# Patient Record
Sex: FEMALE | Race: OTHER | Employment: UNEMPLOYED | URBAN - METROPOLITAN AREA
[De-identification: names, ages, dates, MRNs, and addresses within clinical notes are randomized per-mention and may not be internally consistent; named-entity substitution may affect disease eponyms.]

---

## 2022-04-13 PROBLEM — F44.4 FUNCTIONAL NEUROLOGICAL SYMPTOM DISORDER WITH ABNORMAL MOVEMENT: Status: ACTIVE | Noted: 2018-12-18

## 2022-04-13 PROBLEM — G43.109 MIGRAINE WITH AURA AND WITHOUT STATUS MIGRAINOSUS, NOT INTRACTABLE: Status: ACTIVE | Noted: 2022-04-13

## 2022-04-13 PROBLEM — G04.81 AUTOIMMUNE ENCEPHALOMYELITIS: Status: ACTIVE | Noted: 2017-07-26

## 2022-04-13 PROBLEM — F32.5 MAJOR DEPRESSIVE DISORDER IN FULL REMISSION (HCC): Status: ACTIVE | Noted: 2022-04-13

## 2022-04-25 PROBLEM — R55 VASOVAGAL SYNCOPE: Status: ACTIVE | Noted: 2022-04-25

## 2022-05-03 PROBLEM — R00.0 TACHYCARDIA: Status: ACTIVE | Noted: 2022-05-03

## 2023-05-11 ENCOUNTER — OFFICE VISIT (OUTPATIENT)
Dept: PRIMARY CARE CLINIC | Age: 23
End: 2023-05-11
Payer: COMMERCIAL

## 2023-05-11 VITALS
DIASTOLIC BLOOD PRESSURE: 54 MMHG | HEART RATE: 90 BPM | WEIGHT: 95 LBS | SYSTOLIC BLOOD PRESSURE: 94 MMHG | BODY MASS INDEX: 16.22 KG/M2 | RESPIRATION RATE: 98 BRPM | HEIGHT: 64 IN

## 2023-05-11 DIAGNOSIS — G90.1 DYSAUTONOMIA (HCC): ICD-10-CM

## 2023-05-11 DIAGNOSIS — F32.5 MAJOR DEPRESSIVE DISORDER IN FULL REMISSION, UNSPECIFIED WHETHER RECURRENT (HCC): ICD-10-CM

## 2023-05-11 DIAGNOSIS — G43.109 MIGRAINE WITH AURA AND WITHOUT STATUS MIGRAINOSUS, NOT INTRACTABLE: ICD-10-CM

## 2023-05-11 DIAGNOSIS — F44.4 FUNCTIONAL NEUROLOGICAL SYMPTOM DISORDER WITH ABNORMAL MOVEMENT: ICD-10-CM

## 2023-05-11 DIAGNOSIS — G04.81 AUTOIMMUNE ENCEPHALOMYELITIS: ICD-10-CM

## 2023-05-11 DIAGNOSIS — R11.2 NAUSEA AND VOMITING, UNSPECIFIED VOMITING TYPE: ICD-10-CM

## 2023-05-11 DIAGNOSIS — S39.012D STRAIN OF LUMBAR REGION, SUBSEQUENT ENCOUNTER: ICD-10-CM

## 2023-05-11 DIAGNOSIS — G04.81 AUTOIMMUNE ENCEPHALOMYELITIS: Primary | ICD-10-CM

## 2023-05-11 DIAGNOSIS — M25.551 RIGHT HIP PAIN: ICD-10-CM

## 2023-05-11 DIAGNOSIS — M62.838 MUSCLE SPASM: ICD-10-CM

## 2023-05-11 PROBLEM — S39.012A STRAIN OF LUMBAR REGION: Status: ACTIVE | Noted: 2023-05-11

## 2023-05-11 PROCEDURE — 1036F TOBACCO NON-USER: CPT | Performed by: INTERNAL MEDICINE

## 2023-05-11 PROCEDURE — G8419 CALC BMI OUT NRM PARAM NOF/U: HCPCS | Performed by: INTERNAL MEDICINE

## 2023-05-11 PROCEDURE — G8427 DOCREV CUR MEDS BY ELIG CLIN: HCPCS | Performed by: INTERNAL MEDICINE

## 2023-05-11 PROCEDURE — 99214 OFFICE O/P EST MOD 30 MIN: CPT | Performed by: INTERNAL MEDICINE

## 2023-05-11 RX ORDER — ONDANSETRON 4 MG/1
4 TABLET, FILM COATED ORAL 3 TIMES DAILY PRN
Qty: 60 TABLET | Refills: 5 | Status: SHIPPED | OUTPATIENT
Start: 2023-05-11

## 2023-05-11 RX ORDER — TOPIRAMATE 50 MG/1
50 TABLET, FILM COATED ORAL 2 TIMES DAILY
Qty: 180 TABLET | Refills: 1 | Status: SHIPPED | OUTPATIENT
Start: 2023-05-11 | End: 2023-08-09

## 2023-05-11 RX ORDER — BACLOFEN 10 MG/1
10 TABLET ORAL 3 TIMES DAILY
Qty: 90 TABLET | Refills: 5 | Status: SHIPPED | OUTPATIENT
Start: 2023-05-11

## 2023-05-11 RX ORDER — ESCITALOPRAM OXALATE 10 MG/1
10 TABLET ORAL DAILY
Qty: 90 TABLET | Refills: 1 | Status: SHIPPED | OUTPATIENT
Start: 2023-05-11

## 2023-05-11 RX ORDER — BUPROPION HYDROCHLORIDE 300 MG/1
300 TABLET ORAL
Qty: 90 TABLET | Refills: 1 | Status: SHIPPED | OUTPATIENT
Start: 2023-05-11

## 2023-05-11 SDOH — ECONOMIC STABILITY: INCOME INSECURITY: HOW HARD IS IT FOR YOU TO PAY FOR THE VERY BASICS LIKE FOOD, HOUSING, MEDICAL CARE, AND HEATING?: NOT HARD AT ALL

## 2023-05-11 SDOH — ECONOMIC STABILITY: HOUSING INSECURITY
IN THE LAST 12 MONTHS, WAS THERE A TIME WHEN YOU DID NOT HAVE A STEADY PLACE TO SLEEP OR SLEPT IN A SHELTER (INCLUDING NOW)?: NO

## 2023-05-11 SDOH — ECONOMIC STABILITY: FOOD INSECURITY: WITHIN THE PAST 12 MONTHS, YOU WORRIED THAT YOUR FOOD WOULD RUN OUT BEFORE YOU GOT MONEY TO BUY MORE.: NEVER TRUE

## 2023-05-11 SDOH — ECONOMIC STABILITY: FOOD INSECURITY: WITHIN THE PAST 12 MONTHS, THE FOOD YOU BOUGHT JUST DIDN'T LAST AND YOU DIDN'T HAVE MONEY TO GET MORE.: NEVER TRUE

## 2023-05-11 ASSESSMENT — ENCOUNTER SYMPTOMS
CONSTIPATION: 0
COLOR CHANGE: 0
TROUBLE SWALLOWING: 0
ABDOMINAL PAIN: 0
ABDOMINAL DISTENTION: 0
BACK PAIN: 0
COUGH: 0
NAUSEA: 0
SORE THROAT: 0
ROS SKIN COMMENTS: ACNE
VOMITING: 0
SINUS PRESSURE: 0
SHORTNESS OF BREATH: 0
CHEST TIGHTNESS: 0
DIARRHEA: 0
EYE REDNESS: 0
WHEEZING: 0
EYE PAIN: 0
BLOOD IN STOOL: 0

## 2023-05-11 ASSESSMENT — PATIENT HEALTH QUESTIONNAIRE - PHQ9
2. FEELING DOWN, DEPRESSED OR HOPELESS: 0
SUM OF ALL RESPONSES TO PHQ QUESTIONS 1-9: 0
4. FEELING TIRED OR HAVING LITTLE ENERGY: 0
7. TROUBLE CONCENTRATING ON THINGS, SUCH AS READING THE NEWSPAPER OR WATCHING TELEVISION: 0
10. IF YOU CHECKED OFF ANY PROBLEMS, HOW DIFFICULT HAVE THESE PROBLEMS MADE IT FOR YOU TO DO YOUR WORK, TAKE CARE OF THINGS AT HOME, OR GET ALONG WITH OTHER PEOPLE: 0
6. FEELING BAD ABOUT YOURSELF - OR THAT YOU ARE A FAILURE OR HAVE LET YOURSELF OR YOUR FAMILY DOWN: 0
SUM OF ALL RESPONSES TO PHQ QUESTIONS 1-9: 0
8. MOVING OR SPEAKING SO SLOWLY THAT OTHER PEOPLE COULD HAVE NOTICED. OR THE OPPOSITE, BEING SO FIGETY OR RESTLESS THAT YOU HAVE BEEN MOVING AROUND A LOT MORE THAN USUAL: 0
3. TROUBLE FALLING OR STAYING ASLEEP: 0
9. THOUGHTS THAT YOU WOULD BE BETTER OFF DEAD, OR OF HURTING YOURSELF: 0
SUM OF ALL RESPONSES TO PHQ QUESTIONS 1-9: 0
5. POOR APPETITE OR OVEREATING: 0
SUM OF ALL RESPONSES TO PHQ QUESTIONS 1-9: 0

## 2023-05-11 NOTE — PROGRESS NOTES
failed to calculate for the following reasons: The 2019 ASCVD risk score is only valid for ages 36 to 78     Review of Systems   Constitutional:  Negative for activity change, appetite change, chills, fatigue, fever and unexpected weight change. HENT:  Negative for congestion, ear pain, postnasal drip, sinus pressure, sore throat, tinnitus and trouble swallowing. Eyes:  Negative for pain and redness. Respiratory:  Negative for cough, chest tightness, shortness of breath and wheezing. Cardiovascular:  Negative for chest pain, palpitations and leg swelling. Gastrointestinal:  Negative for abdominal distention, abdominal pain, blood in stool, constipation, diarrhea, nausea and vomiting. Endocrine: Negative for cold intolerance, heat intolerance and polydipsia. Genitourinary:  Negative for decreased urine volume, dysuria, flank pain, frequency, hematuria and urgency. Musculoskeletal:  Negative for arthralgias, back pain, joint swelling, neck pain and neck stiffness. Skin:  Negative for color change and rash. Acne   Neurological:  Positive for weakness. Negative for dizziness, numbness and headaches. Hematological:  Negative for adenopathy. Psychiatric/Behavioral:  Negative for behavioral problems, sleep disturbance and suicidal ideas. The patient is not nervous/anxious. History of depression      BP (!) 94/54 (Site: Left Upper Arm, Position: Sitting, Cuff Size: Medium Adult)   Pulse 90   Resp (!) 98   Ht 5' 4\" (1.626 m)   Wt 95 lb (43.1 kg)   BMI 16.31 kg/m²    Physical Exam  Constitutional:       General: She is not in acute distress. Appearance: Normal appearance. She is not ill-appearing. HENT:      Head: Normocephalic and atraumatic. Right Ear: External ear normal.      Left Ear: External ear normal.      Mouth/Throat:      Mouth: Mucous membranes are moist.      Pharynx: No oropharyngeal exudate or posterior oropharyngeal erythema.    Eyes:      Extraocular

## 2023-05-11 NOTE — ASSESSMENT & PLAN NOTE
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

## 2023-05-18 DIAGNOSIS — Q87.89 VOLTAGE-GATED POTASSIUM CHANNEL ANTIBODY SYNDROME: ICD-10-CM

## 2023-05-18 DIAGNOSIS — F44.4 FUNCTIONAL NEUROLOGICAL SYMPTOM DISORDER WITH ABNORMAL MOVEMENT: ICD-10-CM

## 2023-05-18 DIAGNOSIS — M25.372 LEFT ANKLE INSTABILITY: ICD-10-CM

## 2023-05-18 DIAGNOSIS — R26.9 ABNORMAL GAIT DUE TO MUSCLE WEAKNESS: ICD-10-CM

## 2023-05-18 DIAGNOSIS — M62.81 ABNORMAL GAIT DUE TO MUSCLE WEAKNESS: ICD-10-CM

## 2023-05-18 DIAGNOSIS — Z15.89 MONOALLELIC MUTATION OF SPTAN1 GENE: ICD-10-CM

## 2023-05-18 DIAGNOSIS — G04.81 AUTOIMMUNE ENCEPHALOMYELITIS: ICD-10-CM

## 2023-05-18 DIAGNOSIS — S39.012D STRAIN OF LUMBAR REGION, SUBSEQUENT ENCOUNTER: Primary | ICD-10-CM

## 2023-05-18 DIAGNOSIS — M25.551 RIGHT HIP PAIN: ICD-10-CM

## 2023-05-18 DIAGNOSIS — G04.81 AUTOIMMUNE ENCEPHALOMYELITIS: Primary | ICD-10-CM

## 2023-06-19 DIAGNOSIS — M21.612 BUNION, LEFT FOOT: Primary | ICD-10-CM

## 2023-06-21 DIAGNOSIS — J30.0 VASOMOTOR RHINITIS: ICD-10-CM

## 2023-06-22 RX ORDER — FLUTICASONE PROPIONATE 50 MCG
1 SPRAY, SUSPENSION (ML) NASAL DAILY
Qty: 3 EACH | Refills: 1 | Status: SHIPPED | OUTPATIENT
Start: 2023-06-22

## 2023-07-05 DIAGNOSIS — H53.8 BALINT'S SYNDROME: ICD-10-CM

## 2023-07-05 DIAGNOSIS — Z15.89 MONOALLELIC MUTATION OF SPTAN1 GENE: ICD-10-CM

## 2023-07-05 DIAGNOSIS — E16.2 HYPOGLYCEMIA: Primary | ICD-10-CM

## 2023-07-05 DIAGNOSIS — Q87.89 VOLTAGE-GATED POTASSIUM CHANNEL ANTIBODY SYNDROME: ICD-10-CM

## 2023-07-05 DIAGNOSIS — G04.81 AUTOIMMUNE ENCEPHALOMYELITIS: ICD-10-CM

## 2023-07-12 ENCOUNTER — TELEPHONE (OUTPATIENT)
Dept: PRIMARY CARE CLINIC | Age: 23
End: 2023-07-12

## 2023-07-12 NOTE — TELEPHONE ENCOUNTER
Please call patient @ [phone]. She would like an explanation of the [employer] message that was left for her. She said no authorization is needed and Dr. Adolph Padron just needs to sign the paperwork.

## 2023-07-12 NOTE — TELEPHONE ENCOUNTER
She could schedule a VV. This is  time consuming given all the paperwork that needs to be filled out. I need dedicated appointment to get it done.

## 2023-07-12 NOTE — TELEPHONE ENCOUNTER
Please let patient know that this is a time-consuming process. I need a dedicated office visit of 15 minutes to be able to fill out ALL the paperwork, because I do not have enough time in between seeing patients to do it.

## 2023-07-12 NOTE — TELEPHONE ENCOUNTER
Patient called in concerning the paperwork for her wheelchair. She was informed that she needs to schedule an appointment with Dr. Geoffrey Pickens to fill out the paperwork. I tried to schedule an appointment with the patient. Patient says this is an urgent matter and she can not meet with Dr. Geoffrey Pickens for at least a month because she would need to take time off from work. She would like a phone call to go over the paperwork.

## 2023-07-15 DIAGNOSIS — F32.5 MAJOR DEPRESSIVE DISORDER IN FULL REMISSION, UNSPECIFIED WHETHER RECURRENT (HCC): ICD-10-CM

## 2023-07-15 DIAGNOSIS — L70.0 ACNE VULGARIS: ICD-10-CM

## 2023-07-17 RX ORDER — BUPROPION HYDROCHLORIDE 300 MG/1
300 TABLET ORAL
Qty: 90 TABLET | Refills: 1 | Status: SHIPPED | OUTPATIENT
Start: 2023-07-17

## 2023-07-17 RX ORDER — ESCITALOPRAM OXALATE 10 MG/1
10 TABLET ORAL DAILY
Qty: 90 TABLET | Refills: 1 | Status: SHIPPED | OUTPATIENT
Start: 2023-07-17

## 2023-07-17 RX ORDER — BENZOYL PEROXIDE 10 G/100G
GEL TOPICAL
Qty: 28 G | Refills: 5 | Status: SHIPPED | OUTPATIENT
Start: 2023-07-17

## 2023-09-09 DIAGNOSIS — F32.5 MAJOR DEPRESSIVE DISORDER IN FULL REMISSION, UNSPECIFIED WHETHER RECURRENT (HCC): ICD-10-CM

## 2023-09-09 DIAGNOSIS — G04.81 AUTOIMMUNE ENCEPHALOMYELITIS: ICD-10-CM

## 2023-09-09 DIAGNOSIS — M62.838 MUSCLE SPASM: ICD-10-CM

## 2023-09-11 RX ORDER — BUPROPION HYDROCHLORIDE 300 MG/1
300 TABLET ORAL
Qty: 90 TABLET | Refills: 1 | Status: SHIPPED | OUTPATIENT
Start: 2023-09-11

## 2023-09-11 RX ORDER — BACLOFEN 10 MG/1
10 TABLET ORAL 3 TIMES DAILY
Qty: 90 TABLET | Refills: 5 | Status: SHIPPED | OUTPATIENT
Start: 2023-09-11

## 2023-09-11 RX ORDER — ESCITALOPRAM OXALATE 10 MG/1
10 TABLET ORAL DAILY
Qty: 90 TABLET | Refills: 1 | Status: SHIPPED | OUTPATIENT
Start: 2023-09-11

## 2023-09-12 DIAGNOSIS — G43.109 MIGRAINE WITH AURA AND WITHOUT STATUS MIGRAINOSUS, NOT INTRACTABLE: ICD-10-CM

## 2023-09-13 RX ORDER — TOPIRAMATE 50 MG/1
50 TABLET, FILM COATED ORAL 2 TIMES DAILY
Qty: 180 TABLET | Refills: 1 | Status: SHIPPED | OUTPATIENT
Start: 2023-09-13 | End: 2023-12-12

## 2023-10-13 SDOH — HEALTH STABILITY: PHYSICAL HEALTH: ON AVERAGE, HOW MANY MINUTES DO YOU ENGAGE IN EXERCISE AT THIS LEVEL?: 30 MIN

## 2023-10-13 SDOH — HEALTH STABILITY: PHYSICAL HEALTH: ON AVERAGE, HOW MANY DAYS PER WEEK DO YOU ENGAGE IN MODERATE TO STRENUOUS EXERCISE (LIKE A BRISK WALK)?: 2 DAYS

## 2023-10-13 ASSESSMENT — LIFESTYLE VARIABLES
HOW OFTEN DO YOU HAVE A DRINK CONTAINING ALCOHOL: NEVER
HOW MANY STANDARD DRINKS CONTAINING ALCOHOL DO YOU HAVE ON A TYPICAL DAY: PATIENT DOES NOT DRINK
HOW MANY STANDARD DRINKS CONTAINING ALCOHOL DO YOU HAVE ON A TYPICAL DAY: 0
HOW OFTEN DO YOU HAVE SIX OR MORE DRINKS ON ONE OCCASION: 1
HOW OFTEN DO YOU HAVE A DRINK CONTAINING ALCOHOL: 1

## 2023-10-13 ASSESSMENT — PATIENT HEALTH QUESTIONNAIRE - PHQ9
2. FEELING DOWN, DEPRESSED OR HOPELESS: 0
SUM OF ALL RESPONSES TO PHQ9 QUESTIONS 1 & 2: 0
SUM OF ALL RESPONSES TO PHQ QUESTIONS 1-9: 0
1. LITTLE INTEREST OR PLEASURE IN DOING THINGS: 0
SUM OF ALL RESPONSES TO PHQ QUESTIONS 1-9: 0

## 2023-10-14 DIAGNOSIS — G04.81 AUTOIMMUNE ENCEPHALOMYELITIS: ICD-10-CM

## 2023-10-14 DIAGNOSIS — M62.838 MUSCLE SPASM: ICD-10-CM

## 2023-10-16 ENCOUNTER — OFFICE VISIT (OUTPATIENT)
Dept: PRIMARY CARE CLINIC | Age: 23
End: 2023-10-16
Payer: COMMERCIAL

## 2023-10-16 VITALS
RESPIRATION RATE: 14 BRPM | HEIGHT: 64 IN | SYSTOLIC BLOOD PRESSURE: 104 MMHG | BODY MASS INDEX: 20.66 KG/M2 | WEIGHT: 121 LBS | DIASTOLIC BLOOD PRESSURE: 58 MMHG | OXYGEN SATURATION: 99 % | HEART RATE: 94 BPM

## 2023-10-16 DIAGNOSIS — Z00.00 MEDICARE ANNUAL WELLNESS VISIT, SUBSEQUENT: Primary | ICD-10-CM

## 2023-10-16 DIAGNOSIS — G43.109 MIGRAINE WITH AURA AND WITHOUT STATUS MIGRAINOSUS, NOT INTRACTABLE: ICD-10-CM

## 2023-10-16 DIAGNOSIS — Z12.4 PAP SMEAR FOR CERVICAL CANCER SCREENING: ICD-10-CM

## 2023-10-16 DIAGNOSIS — H53.8 BALINT'S SYNDROME: ICD-10-CM

## 2023-10-16 DIAGNOSIS — Z13.220 SCREENING CHOLESTEROL LEVEL: ICD-10-CM

## 2023-10-16 DIAGNOSIS — G04.81 AUTOIMMUNE ENCEPHALOMYELITIS: ICD-10-CM

## 2023-10-16 DIAGNOSIS — F32.5 MAJOR DEPRESSIVE DISORDER IN FULL REMISSION, UNSPECIFIED WHETHER RECURRENT (HCC): ICD-10-CM

## 2023-10-16 DIAGNOSIS — G90.1 DYSAUTONOMIA (HCC): ICD-10-CM

## 2023-10-16 DIAGNOSIS — Q87.89 VOLTAGE-GATED POTASSIUM CHANNEL ANTIBODY SYNDROME: ICD-10-CM

## 2023-10-16 DIAGNOSIS — E16.2 HYPOGLYCEMIA: ICD-10-CM

## 2023-10-16 DIAGNOSIS — Z15.89 MONOALLELIC MUTATION OF SPTAN1 GENE: ICD-10-CM

## 2023-10-16 DIAGNOSIS — R04.0 BLEEDING NOSE: ICD-10-CM

## 2023-10-16 PROBLEM — F44.4 FUNCTIONAL NEUROLOGICAL SYMPTOM DISORDER WITH ABNORMAL MOVEMENT: Status: RESOLVED | Noted: 2018-12-18 | Resolved: 2023-10-16

## 2023-10-16 PROCEDURE — G0439 PPPS, SUBSEQ VISIT: HCPCS | Performed by: INTERNAL MEDICINE

## 2023-10-16 PROCEDURE — G8484 FLU IMMUNIZE NO ADMIN: HCPCS | Performed by: INTERNAL MEDICINE

## 2023-10-16 RX ORDER — OXYMETAZOLINE HYDROCHLORIDE 0.05 G/100ML
2 SPRAY NASAL 2 TIMES DAILY PRN
Qty: 15 ML | Refills: 3 | Status: SHIPPED | OUTPATIENT
Start: 2023-10-16 | End: 2024-03-14

## 2023-10-16 ASSESSMENT — PATIENT HEALTH QUESTIONNAIRE - PHQ9
SUM OF ALL RESPONSES TO PHQ QUESTIONS 1-9: 0
5. POOR APPETITE OR OVEREATING: 0
7. TROUBLE CONCENTRATING ON THINGS, SUCH AS READING THE NEWSPAPER OR WATCHING TELEVISION: 0
2. FEELING DOWN, DEPRESSED OR HOPELESS: 0
SUM OF ALL RESPONSES TO PHQ9 QUESTIONS 1 & 2: 0
6. FEELING BAD ABOUT YOURSELF - OR THAT YOU ARE A FAILURE OR HAVE LET YOURSELF OR YOUR FAMILY DOWN: 0
3. TROUBLE FALLING OR STAYING ASLEEP: 0
8. MOVING OR SPEAKING SO SLOWLY THAT OTHER PEOPLE COULD HAVE NOTICED. OR THE OPPOSITE, BEING SO FIGETY OR RESTLESS THAT YOU HAVE BEEN MOVING AROUND A LOT MORE THAN USUAL: 0
9. THOUGHTS THAT YOU WOULD BE BETTER OFF DEAD, OR OF HURTING YOURSELF: 0
10. IF YOU CHECKED OFF ANY PROBLEMS, HOW DIFFICULT HAVE THESE PROBLEMS MADE IT FOR YOU TO DO YOUR WORK, TAKE CARE OF THINGS AT HOME, OR GET ALONG WITH OTHER PEOPLE: 0
1. LITTLE INTEREST OR PLEASURE IN DOING THINGS: 0
4. FEELING TIRED OR HAVING LITTLE ENERGY: 0

## 2023-10-16 NOTE — ASSESSMENT & PLAN NOTE
Continue as needed nutritional shakes. Continue monitoring blood sugar we will get some blood work. Referred to endocrinology.

## 2023-10-16 NOTE — ASSESSMENT & PLAN NOTE
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   Patient was admitted in 2002, his diagnosis has resulted in sequelae  Patient no longer able to see  Dr Syed Hickamn Neuroimmunologist at [facility] in [place] by telehealth  Patient will try to establish with  [facility] [employer] clinic when her insurance changes

## 2023-10-16 NOTE — ASSESSMENT & PLAN NOTE
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 Addie Anguiano Neuroimmunologist at the [facility], however no longer sees patient, she  cannot do virtual visits out of state.   Her insurance may change and we will try and get her to see neuro immunologist based out of the [facility] clinic

## 2023-10-16 NOTE — ASSESSMENT & PLAN NOTE
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

## 2023-10-16 NOTE — ASSESSMENT & PLAN NOTE
Patient comes in for  Medicare AWV   we discussed age appropriate USPSTF screens  Over 75% of the visit was spent counselling patient on appropriate lifestyle choices.

## 2023-10-16 NOTE — PATIENT INSTRUCTIONS
down on a couch or recliner chair. Small portable electric or kerosene heaters are responsible for many home fires and should be used cautiously if at all. If you do use one, be sure to keep them away from flammable materials. In case of fire, make sure you have a pre-established emergency exit plan. Have a professional check your fireplace and other fuel-burning appliances yearly. Helping Hands   Baby boomers entering the shen years will continue to see the development of new products to help older adults live safely and independently in spite of age-related changes. Making Life More Livable  , by Charlene Marquez, lists over 1,000 products for \"living well in the mature years,\" such as bathing and mobility aids, household security devices, ergonomically designed knives and peelers, and faucet valves and knobs for temperature control. Medical supply stores and organizations are good sources of information about products that improve your quality of life and insure your safety. Last Reviewed: November 2009 Kandice Mackey MD   Updated: 3/7/2011            Advance Care Planning: Care Instructions  Overview     It can be hard to live with an illness that cannot be cured. But if your health is getting worse, you may want to make decisions about end-of-life care. Planning for the end of your life does not mean that you are giving up. It is a way to make sure that your wishes are met. Clearly stating your wishes can make it easier for your loved ones. Making plans while you are still able may also ease your mind and make your final days less stressful and more meaningful. Follow-up care is a key part of your treatment and safety. Be sure to make and go to all appointments, and call your doctor if you are having problems. It's also a good idea to know your test results and keep a list of the medicines you take. What can you do to plan for the end of life? You can bring these issues up with your doctor.

## 2023-10-16 NOTE — PROGRESS NOTES
Authorizing Provider   oxymetazoline (12 HOUR NASAL SPRAY) 0.05 % nasal spray 2 sprays by Nasal route 2 times daily as needed for Congestion (Nose bleeds) Yes Omar Us MD   topiramate (TOPAMAX) 50 MG tablet Take 1 tablet by mouth 2 times daily Yes Claribel Moore MD   baclofen (LIORESAL) 10 MG tablet Take 1 tablet by mouth 3 times daily Yes Claribel Moore MD   buPROPion (WELLBUTRIN XL) 300 MG extended release tablet Take 1 tablet by mouth Daily with lunch Yes Claribel Moore MD   escitalopram (LEXAPRO) 10 MG tablet Take 1 tablet by mouth daily Yes Claribel Moore MD   benzoyl peroxide 10 % gel Apply topically daily. Yes Claribel Moore MD   Continuous Blood Gluc Sensor (FREESTYLE WENCESLAO 2 SENSOR) MISC 1 each by Does not apply route every 14 days Yes Claribel Moore MD   fluticasone (FLONASE) 50 MCG/ACT nasal spray 1 spray by Nasal route daily Yes Claribel Moore MD   ondansetron (ZOFRAN) 4 MG tablet Take 1 tablet by mouth 3 times daily as needed for Nausea or Vomiting Yes Claribel Moore MD   blood glucose monitor strips Test twice daily - in the morning and evening as needed for symptoms of hypoglycemia Yes Claribel Moore MD   Lancets MISC 1 each by Does not apply route 2 times daily Yes Claribel Moore MD   famotidine (PEPCID) 20 MG tablet Take 1 tablet by mouth 2 times daily Yes Bethany Barrett MD   blood glucose monitor kit and supplies Dispense sufficient amount for indicated testing frequency plus additional to accommodate PRN testing needs. Dispense all needed supplies to include: monitor, strips, lancing device, lancets, control solutions, alcohol swabs.  Yes Claribel Moore MD   Soft Lens Products (SALINE SENSITIVE EYES) SOLN by Does not apply route Yes Bethany Barrett MD   vitamin E 400 UNIT capsule Take 1 capsule by mouth 2 times daily Yes Bethany Barrett MD   magnesium (MAGNESIUM-OXIDE) 250 MG TABS tablet Take 2 tablets by mouth daily Yes Arnold

## 2023-10-16 NOTE — ASSESSMENT & PLAN NOTE
Patient was admitted in 2002, his diagnosis has resulted in sequelae  Patient no longer able to see  Dr Cheryle Hector Neuroimmunologist at [facility] in [place] by telehealth  Patient will try to establish with  [facility] [employer] clinic when her insurance changes

## 2023-10-17 RX ORDER — BACLOFEN 10 MG/1
10 TABLET ORAL 3 TIMES DAILY
Qty: 90 TABLET | Refills: 5 | OUTPATIENT
Start: 2023-10-17

## 2023-11-15 PROBLEM — Z00.00 MEDICARE ANNUAL WELLNESS VISIT, SUBSEQUENT: Status: RESOLVED | Noted: 2022-05-03 | Resolved: 2023-11-15

## 2023-11-18 DIAGNOSIS — G43.109 MIGRAINE WITH AURA AND WITHOUT STATUS MIGRAINOSUS, NOT INTRACTABLE: ICD-10-CM

## 2023-11-18 DIAGNOSIS — F32.5 MAJOR DEPRESSIVE DISORDER IN FULL REMISSION, UNSPECIFIED WHETHER RECURRENT (HCC): ICD-10-CM

## 2023-11-20 RX ORDER — ESCITALOPRAM OXALATE 10 MG/1
10 TABLET ORAL DAILY
Qty: 90 TABLET | Refills: 1 | Status: SHIPPED | OUTPATIENT
Start: 2023-11-20

## 2023-11-20 RX ORDER — BUPROPION HYDROCHLORIDE 300 MG/1
300 TABLET ORAL
Qty: 90 TABLET | Refills: 1 | Status: SHIPPED | OUTPATIENT
Start: 2023-11-20

## 2023-11-20 RX ORDER — TOPIRAMATE 50 MG/1
50 TABLET, FILM COATED ORAL 2 TIMES DAILY
Qty: 180 TABLET | Refills: 1 | Status: SHIPPED | OUTPATIENT
Start: 2023-11-20 | End: 2024-05-18

## 2023-11-22 ENCOUNTER — OFFICE VISIT (OUTPATIENT)
Dept: PRIMARY CARE CLINIC | Age: 23
End: 2023-11-22
Payer: MEDICARE

## 2023-11-22 VITALS
SYSTOLIC BLOOD PRESSURE: 104 MMHG | RESPIRATION RATE: 18 BRPM | WEIGHT: 123 LBS | BODY MASS INDEX: 20.49 KG/M2 | OXYGEN SATURATION: 99 % | HEIGHT: 65 IN | DIASTOLIC BLOOD PRESSURE: 64 MMHG | HEART RATE: 87 BPM

## 2023-11-22 DIAGNOSIS — G04.81 AUTOIMMUNE ENCEPHALOMYELITIS: Primary | ICD-10-CM

## 2023-11-22 DIAGNOSIS — Z15.89 MONOALLELIC MUTATION OF SPTAN1 GENE: ICD-10-CM

## 2023-11-22 DIAGNOSIS — G90.1 DYSAUTONOMIA (HCC): ICD-10-CM

## 2023-11-22 DIAGNOSIS — G43.109 MIGRAINE WITH AURA AND WITHOUT STATUS MIGRAINOSUS, NOT INTRACTABLE: ICD-10-CM

## 2023-11-22 DIAGNOSIS — Q87.89 VOLTAGE-GATED POTASSIUM CHANNEL ANTIBODY SYNDROME: ICD-10-CM

## 2023-11-22 DIAGNOSIS — E16.2 HYPOGLYCEMIA: ICD-10-CM

## 2023-11-22 PROCEDURE — G8420 CALC BMI NORM PARAMETERS: HCPCS | Performed by: INTERNAL MEDICINE

## 2023-11-22 PROCEDURE — 99213 OFFICE O/P EST LOW 20 MIN: CPT | Performed by: INTERNAL MEDICINE

## 2023-11-22 PROCEDURE — G8484 FLU IMMUNIZE NO ADMIN: HCPCS | Performed by: INTERNAL MEDICINE

## 2023-11-22 PROCEDURE — 1036F TOBACCO NON-USER: CPT | Performed by: INTERNAL MEDICINE

## 2023-11-22 PROCEDURE — G8427 DOCREV CUR MEDS BY ELIG CLIN: HCPCS | Performed by: INTERNAL MEDICINE

## 2023-11-22 ASSESSMENT — ENCOUNTER SYMPTOMS
DIARRHEA: 0
EYE REDNESS: 0
NAUSEA: 0
COUGH: 0
CONSTIPATION: 0
ABDOMINAL DISTENTION: 0
VOMITING: 0
ABDOMINAL PAIN: 0
SORE THROAT: 0
WHEEZING: 0
SHORTNESS OF BREATH: 0
EYE PAIN: 0
CHEST TIGHTNESS: 0
COLOR CHANGE: 0
BLOOD IN STOOL: 0
TROUBLE SWALLOWING: 0
BACK PAIN: 0
SINUS PRESSURE: 0

## 2023-11-22 NOTE — ASSESSMENT & PLAN NOTE
Patient was admitted in 2002, his diagnosis has resulted in sequelae  Patient no longer able to see  Dr Guillaume Tran Neuroimmunologist at [facility] in [place] by telehealth  Patient will try to establish with  [facility] [employer] clinic when her insurance changes

## 2023-11-22 NOTE — ASSESSMENT & PLAN NOTE
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   Patient was admitted in 2002, his diagnosis has resulted in sequelae  Patient no longer able to see  Dr Sheldon Wesley Neuroimmunologist at [facility] in [place] by telehealth  Patient will try to establish with  [facility] [employer] clinic when her insurance changes

## 2023-11-22 NOTE — ASSESSMENT & PLAN NOTE
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

## 2023-11-22 NOTE — PROGRESS NOTES
Huseyin Lowe (2000) is a 20 y.o. female   Follow-up     General health: This patient presents for check up and refills. The problem and medicine lists and chart were reviewed in detail. The patient has been worked up and treated for this/these condition/s and is compliant with taking the medication without any significant side effects. The patient's condition/s is/are chronic and unchanged and otherwise remains stable. Feels well with minor complaints. Main Problem Review - H/o Autoimmune encephalitis/Mutation of SPTN 1 gene/voltage gated potassium channel antibody syndrome/ -  Patient has complex complex PMH  Resulting in movement disorder, muscle weakness, dizziness, abnormal speech, gait problems dysautonomia. She uses wheel chair for mobility. She works and is also student and uses bus for transportation. The patient feels fine, she comes in to have an accommodations letter written for her school, requesting  additional time to be able to eat during test taking. Other issues discussed     Migraine headaches - Patient denies complaints or symptoms today. Patient mentions she is adherent with treatment. 2. Dysautonomia -patient saw cardiologist for tachycardia and presyncope thought to be because of dysautonomia. Patient had an isolated event has not had recurrence. Patient denies complaints or symptoms today. 3 Hypoglycemia -patient has to eat every few hours to avoid episodes of low blood sugar. 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 Health Maintenance    Annual retinal eye exam - 2/2022  Annual Dentist visit - 10/2021  Tobacco smoking -  NO  Alcohol Misuse - NO  Illicit Drug Use- NO  Healthy Diet and physical activity -  YES  Obesity Screen- screened 4/13/2022  Wears seat belt- YES  End of life directives discussed with patient. -Mentions she does not have  a will/or/an advanced directives.   Was

## 2023-11-22 NOTE — ASSESSMENT & PLAN NOTE
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 Miller Ingram Neuroimmunologist at the [facility], however no longer sees patient, she  cannot do virtual visits out of state.   Her insurance may change and we will try and get her to see neuro immunologist based out of the [facility] [employer] [employer] clinic

## 2023-12-01 ENCOUNTER — TELEPHONE (OUTPATIENT)
Dept: PRIMARY CARE CLINIC | Age: 23
End: 2023-12-01

## 2023-12-01 NOTE — TELEPHONE ENCOUNTER
Awa Huerta called from [place], [place] # [phone]. The patient presented Niranjan Guthrie with a disability letter that Niranjan Guthrie wanted to go over and clarify some things with Dr. Gail Bronson. Niranjan Guthrie says what the letter says and what the patient is telling her are not matching up. She would like to discuss with Dr. Gail Bronson.

## 2023-12-14 DIAGNOSIS — J30.0 VASOMOTOR RHINITIS: ICD-10-CM

## 2023-12-14 RX ORDER — FLUTICASONE PROPIONATE 50 MCG
SPRAY, SUSPENSION (ML) NASAL
Qty: 48 G | Refills: 5 | Status: SHIPPED | OUTPATIENT
Start: 2023-12-14

## 2024-01-12 ENCOUNTER — OFFICE VISIT (OUTPATIENT)
Dept: ENDOCRINOLOGY | Age: 24
End: 2024-01-12
Payer: COMMERCIAL

## 2024-01-12 VITALS
DIASTOLIC BLOOD PRESSURE: 71 MMHG | HEIGHT: 65 IN | SYSTOLIC BLOOD PRESSURE: 121 MMHG | HEART RATE: 93 BPM | BODY MASS INDEX: 20.66 KG/M2 | WEIGHT: 124 LBS

## 2024-01-12 DIAGNOSIS — Q87.89 VOLTAGE-GATED POTASSIUM CHANNEL ANTIBODY SYNDROME: ICD-10-CM

## 2024-01-12 DIAGNOSIS — E16.2 HYPOGLYCEMIA: Primary | ICD-10-CM

## 2024-01-12 PROCEDURE — G8427 DOCREV CUR MEDS BY ELIG CLIN: HCPCS | Performed by: INTERNAL MEDICINE

## 2024-01-12 PROCEDURE — G8484 FLU IMMUNIZE NO ADMIN: HCPCS | Performed by: INTERNAL MEDICINE

## 2024-01-12 PROCEDURE — G8420 CALC BMI NORM PARAMETERS: HCPCS | Performed by: INTERNAL MEDICINE

## 2024-01-12 PROCEDURE — 99205 OFFICE O/P NEW HI 60 MIN: CPT | Performed by: INTERNAL MEDICINE

## 2024-01-12 PROCEDURE — 1036F TOBACCO NON-USER: CPT | Performed by: INTERNAL MEDICINE

## 2024-01-12 RX ORDER — FEXOFENADINE HCL 60 MG/1
60 TABLET, FILM COATED ORAL DAILY
COMMUNITY

## 2024-01-12 RX ORDER — SODIUM FLUORIDE AND POTASSIUM NITRATE 5.8; 57.5 MG/ML; MG/ML
GEL, DENTIFRICE DENTAL
COMMUNITY
Start: 2023-11-06

## 2024-01-12 NOTE — PROGRESS NOTES
[facility]  Initial Patient Visit        Patient:  Johanny Murphy                                               : 2000    MRN: [id number]  Date : 2024      CHIEF COMPLAINT:   Chief Complaint   Patient presents with    New Patient     hypoglycemia        HISTORY OF PRESENT ILLNESS:   Johanny Murphy is a very pleasant 24 y.o. female who presents with for evaluation for hypoglycemia. 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    Patient reports having symptoms of confusion and tiredness for many years.  Due to muscular dystrophy, she has difficulty with ADLs and will usually ambulate by walking for short distances and using a wheelchair for longer distances or outside visits.    She reports that for many years she was diagnosed with hypoglycemia and reports that she had multiple occasions of high or low blood sugars.  She reports that glucometer in the past read as low as LOW and otherwise in the 40-70 range.  She also has had blood sugars in the 170s after meals.  She was advised to eat high-protein diet with frequent small meals.  She also was provided with freestyle noah sensor which she is currently using.  She was advised that completing testing requires her to be fasting which was deemed unsafe for her.  Most recently, freestyle noah shows average glucose of 96 with time in range of 95%, low at 4% and very low at 1%.  No hyperglycemia.  Most of low blood sugars are overnight.  Blood sugars last night read in the 50s to 60s.  While in clinic, noah read at 54.  Glucometer check at the same time was at 100.    Patient reports that most often she is asymptomatic with those readings but might occasionally feel tired.  She had a seizure where she lost consciousness 2 years ago and she thinks it

## 2024-02-16 ENCOUNTER — TELEMEDICINE (OUTPATIENT)
Dept: PRIMARY CARE CLINIC | Age: 24
End: 2024-02-16

## 2024-02-16 DIAGNOSIS — Z15.89 MONOALLELIC MUTATION OF SPTAN1 GENE: ICD-10-CM

## 2024-02-16 DIAGNOSIS — G90.1 DYSAUTONOMIA (HCC): ICD-10-CM

## 2024-02-16 DIAGNOSIS — G43.109 MIGRAINE WITH AURA AND WITHOUT STATUS MIGRAINOSUS, NOT INTRACTABLE: ICD-10-CM

## 2024-02-16 DIAGNOSIS — Q87.89 VOLTAGE-GATED POTASSIUM CHANNEL ANTIBODY SYNDROME: ICD-10-CM

## 2024-02-16 DIAGNOSIS — F32.5 MAJOR DEPRESSIVE DISORDER IN FULL REMISSION, UNSPECIFIED WHETHER RECURRENT (HCC): ICD-10-CM

## 2024-02-16 DIAGNOSIS — E16.2 HYPOGLYCEMIA: ICD-10-CM

## 2024-02-16 DIAGNOSIS — G04.81 AUTOIMMUNE ENCEPHALOMYELITIS: Primary | ICD-10-CM

## 2024-02-16 PROBLEM — E22.1 HYPERPROLACTINEMIA (HCC): Status: RESOLVED | Noted: 2019-11-20 | Resolved: 2024-02-16

## 2024-02-16 RX ORDER — TOPIRAMATE 50 MG/1
50 TABLET, FILM COATED ORAL 2 TIMES DAILY
Qty: 180 TABLET | Refills: 1 | Status: SHIPPED | OUTPATIENT
Start: 2024-02-16 | End: 2024-08-14

## 2024-02-16 NOTE — ASSESSMENT & PLAN NOTE
Patient was admitted in 2002, his diagnosis has resulted in sequelae  Patient no longer able to see  Dr Miguel Tinajero Neuroimmunologist at [facility] in [place] by telehealth

## 2024-02-16 NOTE — ASSESSMENT & PLAN NOTE
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 Miguel Tinajero Neuroimmunologist at the [facility], however no longer sees patient, she  cannot do virtual visits out of state.

## 2024-02-16 NOTE — PROGRESS NOTES
Johanny Murphy, was evaluated through a synchronous (real-time) audio-video encounter. The patient (or guardian if applicable) is aware that this is a billable service, which includes applicable co-pays.  The virtual visit was conducted with patient's (and/or legal guardians) consent. Verbal consent to proceed has been obtained within the past 12 months. The visit was conducted pursuant to the emergency declaration under the [place] Act and the National Emergencies Act, 1135 waiver authority and the Coronavirus Preparedness and Response Supplemental Appropriations Act.  Patient identification was verified, and a caregiver was present when appropriate. The patient was located in the [facility] where the provider was licensed to provide care.     Services were provided through a video synchronous discussion virtually to substitute for in-person clinic visit. Patient was located in their home. Provider was located in the office.      An electronic signature was used to authenticate this note.     This dictation was generated by voice recognition computer software. Although all attempts are made to edit the dictation for accuracy, there may be errors in the transcription that are not intended.     The patient was located at Home: [address] [place] [address]  Provider was located at Facility (Appt Dept): [address] [place] [address]      Johanny Murphy (:  2000) is a Established patient, presenting virtually for evaluation of the following:    Assessment & Plan   Below is the assessment and plan developed based on review of pertinent history, physical exam, labs, studies, and medications.  1. 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  Patient was admitted in , her diagnosis has resulted in sequelae  Patient no longer able to see  Dr Miguel Tinajero Neuroimmunologist at

## 2024-02-16 NOTE — ASSESSMENT & PLAN NOTE
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  Patient was admitted in 2002, her diagnosis has resulted in sequelae  Patient no longer able to see  Dr Miguel Tinajero Neuroimmunologist at [facility] in [place] by telehealth  Patient  willing to establish with [facility] specialist

## 2024-02-16 NOTE — ASSESSMENT & PLAN NOTE
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

## 2024-03-07 ENCOUNTER — PATIENT MESSAGE (OUTPATIENT)
Dept: ENDOCRINOLOGY | Age: 24
End: 2024-03-07

## 2024-03-07 DIAGNOSIS — G04.81 AUTOIMMUNE ENCEPHALOMYELITIS: ICD-10-CM

## 2024-03-07 DIAGNOSIS — G43.109 MIGRAINE WITH AURA AND WITHOUT STATUS MIGRAINOSUS, NOT INTRACTABLE: ICD-10-CM

## 2024-03-07 DIAGNOSIS — E16.2 HYPOGLYCEMIA: Primary | ICD-10-CM

## 2024-03-07 DIAGNOSIS — M62.838 MUSCLE SPASM: ICD-10-CM

## 2024-03-07 DIAGNOSIS — F32.5 MAJOR DEPRESSIVE DISORDER IN FULL REMISSION, UNSPECIFIED WHETHER RECURRENT (HCC): ICD-10-CM

## 2024-03-07 RX ORDER — BACLOFEN 10 MG/1
10 TABLET ORAL 3 TIMES DAILY
Qty: 90 TABLET | Refills: 5 | Status: SHIPPED | OUTPATIENT
Start: 2024-03-07

## 2024-03-07 RX ORDER — BUPROPION HYDROCHLORIDE 300 MG/1
300 TABLET ORAL
Qty: 90 TABLET | Refills: 1 | Status: SHIPPED | OUTPATIENT
Start: 2024-03-07

## 2024-03-07 RX ORDER — ESCITALOPRAM OXALATE 10 MG/1
10 TABLET ORAL DAILY
Qty: 90 TABLET | Refills: 1 | Status: SHIPPED | OUTPATIENT
Start: 2024-03-07

## 2024-03-07 RX ORDER — TOPIRAMATE 50 MG/1
50 TABLET, FILM COATED ORAL 2 TIMES DAILY
Qty: 180 TABLET | Refills: 1 | Status: SHIPPED | OUTPATIENT
Start: 2024-03-07 | End: 2024-09-03

## 2024-03-07 NOTE — TELEPHONE ENCOUNTER
From: Johanny Murphy  To: Dr. Oneyda Fowler  Sent: 3/7/2024 7:14 AM EST  Subject: Glucose Monitor Options?     Good Morning,   I’m not sure if you heard about the [employer] outage a couple weeks ago. Because of the fact the Laura 2 reads via my phone’s Bluetooth, and the outage took that out also, I had no idea what my blood sugar was, and that it was jumping all over the place (iesha high and then plummeting), which it tends to do with changes in barometric pressure. (It was raining that day). When this happens, I’m normally able to get ahead of it and control it so that I don’t become sick, but because of the fact there was no phone service, I could not. I was on the bus when it happened, and because of the fact it went unnoticed for too long, I had a seizure (which I’ve had before, but we didn’t realize it was blood sugar related until about a year ago). The  ended up having to declare an emergency and dispatch called 911 and the paramedics were sent. All my muscles have been tensed up sense then, and I’ve been exhausted, because when my blood sugar is allowed to relapse, it affects my other medical   condition(s), and it takes me weeks, if not months to bounce back. With the Laura, you either have to use the phone kevin, or the reader. You can’t switch back and forth. Whatever you use you have to start the sensor with. My question is, is there another CGM that allows you to switch back and forth in case another phone outage like this were to occur? (And yes I tried to put it on the [employer], nada, we’re in the process of suing [employer] for several reasons).

## 2024-03-08 NOTE — TELEPHONE ENCOUNTER
May you pull up for noah report from the past month? I would like to look at the data available. Thank you.

## 2024-03-12 RX ORDER — ACYCLOVIR 400 MG/1
1 TABLET ORAL
Qty: 3 EACH | Refills: 1 | Status: SHIPPED | OUTPATIENT
Start: 2024-03-12

## 2024-03-12 RX ORDER — ACYCLOVIR 400 MG/1
1 TABLET ORAL ONCE
Qty: 1 EACH | Refills: 1 | Status: SHIPPED | OUTPATIENT
Start: 2024-03-12 | End: 2024-03-12

## 2024-03-12 RX ORDER — GLUCAGON 3 MG/1
POWDER NASAL
Qty: 2 EACH | Refills: 3 | Status: SHIPPED | OUTPATIENT
Start: 2024-03-12

## 2024-03-12 NOTE — TELEPHONE ENCOUNTER
Submitted a prescription for Dexcom G7 as well as Baqsimi inhaler for hypoglycemia.  I would recommend to use Baqsimi in case blood sugars are below 60 and unable to treat by herself.  Please update patient.

## 2024-03-30 DIAGNOSIS — G04.81 AUTOIMMUNE ENCEPHALOMYELITIS: ICD-10-CM

## 2024-03-30 DIAGNOSIS — M62.838 MUSCLE SPASM: ICD-10-CM

## 2024-03-30 DIAGNOSIS — G43.109 MIGRAINE WITH AURA AND WITHOUT STATUS MIGRAINOSUS, NOT INTRACTABLE: ICD-10-CM

## 2024-03-30 DIAGNOSIS — F32.5 MAJOR DEPRESSIVE DISORDER IN FULL REMISSION, UNSPECIFIED WHETHER RECURRENT (HCC): ICD-10-CM

## 2024-04-01 RX ORDER — TOPIRAMATE 50 MG/1
50 TABLET, FILM COATED ORAL 2 TIMES DAILY
Qty: 180 TABLET | Refills: 1 | Status: SHIPPED | OUTPATIENT
Start: 2024-04-01 | End: 2024-09-28

## 2024-04-01 RX ORDER — BUPROPION HYDROCHLORIDE 300 MG/1
300 TABLET ORAL
Qty: 90 TABLET | Refills: 1 | Status: SHIPPED | OUTPATIENT
Start: 2024-04-01

## 2024-04-01 RX ORDER — BACLOFEN 10 MG/1
10 TABLET ORAL 3 TIMES DAILY
Qty: 90 TABLET | Refills: 5 | Status: SHIPPED | OUTPATIENT
Start: 2024-04-01

## 2024-04-12 ENCOUNTER — TELEMEDICINE (OUTPATIENT)
Dept: ENDOCRINOLOGY | Age: 24
End: 2024-04-12
Payer: COMMERCIAL

## 2024-04-12 DIAGNOSIS — Q87.89 VOLTAGE-GATED POTASSIUM CHANNEL ANTIBODY SYNDROME: ICD-10-CM

## 2024-04-12 DIAGNOSIS — E16.2 HYPOGLYCEMIA: Primary | ICD-10-CM

## 2024-04-12 DIAGNOSIS — E16.2 HYPOGLYCEMIA: ICD-10-CM

## 2024-04-12 PROCEDURE — G8420 CALC BMI NORM PARAMETERS: HCPCS | Performed by: INTERNAL MEDICINE

## 2024-04-12 PROCEDURE — 1036F TOBACCO NON-USER: CPT | Performed by: INTERNAL MEDICINE

## 2024-04-12 PROCEDURE — G8427 DOCREV CUR MEDS BY ELIG CLIN: HCPCS | Performed by: INTERNAL MEDICINE

## 2024-04-12 PROCEDURE — 99214 OFFICE O/P EST MOD 30 MIN: CPT | Performed by: INTERNAL MEDICINE

## 2024-04-12 RX ORDER — ACYCLOVIR 400 MG/1
1 TABLET ORAL
Qty: 3 EACH | Refills: 1 | Status: SHIPPED | OUTPATIENT
Start: 2024-04-12

## 2024-04-12 NOTE — PROGRESS NOTES
[facility]  Initial Patient Visit        Patient:  Johanny Murphy                                               : 2000    MRN: [id number]  Date : 2024      CHIEF COMPLAINT:   Chief Complaint   Patient presents with    Other        HISTORY OF PRESENT ILLNESS:   Johanny Murphy is a very pleasant 24 y.o. female who presents with for follow up for hypoglycemia. 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  Visit today is virtual.    Patient reports having symptoms of confusion and tiredness for many years.  Due to muscular dystrophy, she has difficulty with ADLs and will usually ambulate by walking for short distances and using a wheelchair for longer distances or outside visits.    She reports that for many years she was diagnosed with hypoglycemia and reports that she had multiple occasions of high or low blood sugars. She reports that glucometer in the past read as low as LOW and otherwise in the 40-70 range.  She also has had blood sugars in the 170s after meals.  She was advised to eat high-protein diet with frequent small meals.  She also was provided with freestyle noah sensor. She was advised that completing testing requires her to be fasting which was deemed unsafe for her.      Patient reports that most often she is asymptomatic with those readings but might occasionally feel tired.  She had a seizure where she lost consciousness 2 years ago and she thinks it was related to low blood sugars though she denies that her blood sugars were checked then and reports that she regained consciousness spontaneously.    She had another episode in early .  She was in the riding the bus.  Due to [employer] outage, noah was not connecting through her phone.  Per patient, she had a seizure and needed an

## 2024-04-22 ENCOUNTER — TELEPHONE (OUTPATIENT)
Dept: PRIMARY CARE CLINIC | Age: 24
End: 2024-04-22

## 2024-04-22 NOTE — TELEPHONE ENCOUNTER
----- Message from Omar Us MD sent at 4/22/2024  8:42 AM EDT -----  Regarding: Follow-up visit  Please schedule patient for office or virtual visit around 6/16/2024

## 2024-04-24 NOTE — TELEPHONE ENCOUNTER
Patient is waiting to see what her work schedule will be around then. She will know in a few weeks. She didn't want to schedule an appointment and then have to change it.

## 2024-05-20 DIAGNOSIS — E16.2 HYPOGLYCEMIA: ICD-10-CM

## 2024-05-21 RX ORDER — ACYCLOVIR 400 MG/1
1 TABLET ORAL
Qty: 3 EACH | Refills: 1 | Status: SHIPPED | OUTPATIENT
Start: 2024-05-21

## 2024-05-21 NOTE — TELEPHONE ENCOUNTER
Requested Prescriptions     Signed Prescriptions Disp Refills    Continuous Glucose Sensor (DEXCOM G7 SENSOR) MISC 3 each 1     Si each by Does not apply route every 10 days     Authorizing Provider: WM LIND     Ordering User: [id number] [address] - P [phone] - F [phone] [address]  [facility] [address]  Phone: [phone] Fax: [phone]

## 2024-05-22 RX ORDER — LANCETS 30 GAUGE
1 EACH MISCELLANEOUS 2 TIMES DAILY
Qty: 300 EACH | Refills: 3 | Status: SHIPPED | OUTPATIENT
Start: 2024-05-22

## 2024-05-22 RX ORDER — GLUCOSAMINE HCL/CHONDROITIN SU 500-400 MG
CAPSULE ORAL
Qty: 100 STRIP | Refills: 11 | Status: SHIPPED | OUTPATIENT
Start: 2024-05-22

## 2024-06-07 SDOH — ECONOMIC STABILITY: FOOD INSECURITY: WITHIN THE PAST 12 MONTHS, YOU WORRIED THAT YOUR FOOD WOULD RUN OUT BEFORE YOU GOT MONEY TO BUY MORE.: NEVER TRUE

## 2024-06-07 SDOH — ECONOMIC STABILITY: FOOD INSECURITY: WITHIN THE PAST 12 MONTHS, THE FOOD YOU BOUGHT JUST DIDN'T LAST AND YOU DIDN'T HAVE MONEY TO GET MORE.: NEVER TRUE

## 2024-06-07 SDOH — ECONOMIC STABILITY: INCOME INSECURITY: HOW HARD IS IT FOR YOU TO PAY FOR THE VERY BASICS LIKE FOOD, HOUSING, MEDICAL CARE, AND HEATING?: HARD

## 2024-06-07 SDOH — ECONOMIC STABILITY: TRANSPORTATION INSECURITY
IN THE PAST 12 MONTHS, HAS LACK OF TRANSPORTATION KEPT YOU FROM MEETINGS, WORK, OR FROM GETTING THINGS NEEDED FOR DAILY LIVING?: YES

## 2024-06-10 ENCOUNTER — OFFICE VISIT (OUTPATIENT)
Dept: PRIMARY CARE CLINIC | Age: 24
End: 2024-06-10
Payer: COMMERCIAL

## 2024-06-10 VITALS
OXYGEN SATURATION: 99 % | RESPIRATION RATE: 16 BRPM | SYSTOLIC BLOOD PRESSURE: 98 MMHG | HEART RATE: 81 BPM | TEMPERATURE: 98.2 F | WEIGHT: 133 LBS | BODY MASS INDEX: 22.71 KG/M2 | HEIGHT: 64 IN | DIASTOLIC BLOOD PRESSURE: 56 MMHG

## 2024-06-10 DIAGNOSIS — Z13.220 SCREENING CHOLESTEROL LEVEL: ICD-10-CM

## 2024-06-10 DIAGNOSIS — Z15.89 MONOALLELIC MUTATION OF SPTAN1 GENE: ICD-10-CM

## 2024-06-10 DIAGNOSIS — F32.5 MAJOR DEPRESSIVE DISORDER IN FULL REMISSION, UNSPECIFIED WHETHER RECURRENT (HCC): ICD-10-CM

## 2024-06-10 DIAGNOSIS — G43.109 MIGRAINE WITH AURA AND WITHOUT STATUS MIGRAINOSUS, NOT INTRACTABLE: ICD-10-CM

## 2024-06-10 DIAGNOSIS — Q87.89 VOLTAGE-GATED POTASSIUM CHANNEL ANTIBODY SYNDROME: ICD-10-CM

## 2024-06-10 DIAGNOSIS — J30.0 VASOMOTOR RHINITIS: ICD-10-CM

## 2024-06-10 DIAGNOSIS — G04.81 AUTOIMMUNE ENCEPHALOMYELITIS: Primary | ICD-10-CM

## 2024-06-10 DIAGNOSIS — E55.9 VITAMIN D DEFICIENCY: ICD-10-CM

## 2024-06-10 DIAGNOSIS — R53.83 OTHER FATIGUE: ICD-10-CM

## 2024-06-10 DIAGNOSIS — R73.09 IMPAIRED GLUCOSE REGULATION: ICD-10-CM

## 2024-06-10 PROCEDURE — 99214 OFFICE O/P EST MOD 30 MIN: CPT | Performed by: INTERNAL MEDICINE

## 2024-06-10 PROCEDURE — G2211 COMPLEX E/M VISIT ADD ON: HCPCS | Performed by: INTERNAL MEDICINE

## 2024-06-10 PROCEDURE — G8427 DOCREV CUR MEDS BY ELIG CLIN: HCPCS | Performed by: INTERNAL MEDICINE

## 2024-06-10 PROCEDURE — G8420 CALC BMI NORM PARAMETERS: HCPCS | Performed by: INTERNAL MEDICINE

## 2024-06-10 PROCEDURE — 4004F PT TOBACCO SCREEN RCVD TLK: CPT | Performed by: INTERNAL MEDICINE

## 2024-06-10 ASSESSMENT — ENCOUNTER SYMPTOMS
EYE REDNESS: 0
CONSTIPATION: 0
CHEST TIGHTNESS: 0
BLOOD IN STOOL: 0
WHEEZING: 0
EYE PAIN: 0
VOMITING: 0
ABDOMINAL DISTENTION: 0
BACK PAIN: 0
COUGH: 0
NAUSEA: 0
DIARRHEA: 0
SORE THROAT: 0
COLOR CHANGE: 0
TROUBLE SWALLOWING: 0
SINUS PRESSURE: 0
ABDOMINAL PAIN: 0
SHORTNESS OF BREATH: 0

## 2024-06-10 NOTE — PROGRESS NOTES
Neuroimmunologist at [facility] by telehealth    3. Monoallelic mutation of SPTAN1 gene  Assessment & Plan:   Mutation of SPTAN1 gene, unsure if it is implicated in voltage-gated potassium channel antibody syndrome which has resulted in muscular weakness and different comorbidities  Patient used  to see Dr. Miguel Tinajero Neuroimmunologist at the [facility], however no longer sees patient, she  cannot do virtual visits out of state.    4. Migraine with aura and without status migrainosus, not intractable  Assessment & Plan:   Stable and controlled on Topamax 50 mg twice daily  5. Major depressive disorder in full remission, unspecified whether recurrent (HCC)  Assessment & Plan:   Stable and controlled on Wellbutrin  mg daily, Lexapro 10 mg daily    6. Vasomotor rhinitis  Assessment & Plan:  Stable on Flonase  7. Impaired glucose regulation  -     CBC with Auto Differential; Future  -     Comprehensive Metabolic Panel; Future  -     Hemoglobin A1C; Future  8. Screening cholesterol level  -     Lipid Panel; Future  9. Vitamin D deficiency  -     Vitamin D 25 Hydroxy; Future  10. Other fatigue  -     TSH with Reflex; Future  -     Vitamin B12; Future    Preventative care discussed.  Encouraged healthy diet choices, reg exercise. Patient agreed w/plan and verbal understanding. Patient advised to call or return with any concerns or problems or worsening conditions. Go to the ER for any severe or potentially life threatening problems.    Return in about 1 month (around 7/10/2024) for  Medicare AWV.     This note was generated in part or in whole with voice recognition software.  Voice recognition is usually quite accurate but there are transcription errors that can often occur.  All attempts were made to correct these errors I apologized for any  typographical errors that were not detected and corrected.     Electronically signed by Omar Us MD on 6/10/2024

## 2024-06-10 NOTE — ASSESSMENT & PLAN NOTE
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  Patient was admitted in 2002, her diagnosis has resulted in sequelae  Patient no longer able to see  Dr Miguel Tinajero Neuroimmunologist at [facility] in [place] by telehealth  Patient  willing to establish with [facility] specialist

## 2024-06-10 NOTE — ASSESSMENT & PLAN NOTE
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 Miguel Tinajero Neuroimmunologist at the [facility], however no longer sees patient, she  cannot do virtual visits out of state.

## 2024-06-10 NOTE — ASSESSMENT & PLAN NOTE
Patient was admitted in 2002, his diagnosis has resulted in sequelae  Patient no longer able to see  Dr Miguel Tinajero Neuroimmunologist at [facility] in [place] by telehealth

## 2024-06-25 DIAGNOSIS — E16.2 HYPOGLYCEMIA: ICD-10-CM

## 2024-06-25 RX ORDER — ACYCLOVIR 400 MG/1
1 TABLET ORAL
Qty: 3 EACH | Refills: 1 | Status: SHIPPED | OUTPATIENT
Start: 2024-06-25

## 2024-06-28 SDOH — HEALTH STABILITY: PHYSICAL HEALTH: ON AVERAGE, HOW MANY DAYS PER WEEK DO YOU ENGAGE IN MODERATE TO STRENUOUS EXERCISE (LIKE A BRISK WALK)?: 1 DAY

## 2024-06-28 SDOH — HEALTH STABILITY: PHYSICAL HEALTH: ON AVERAGE, HOW MANY MINUTES DO YOU ENGAGE IN EXERCISE AT THIS LEVEL?: 60 MIN

## 2024-06-28 ASSESSMENT — PATIENT HEALTH QUESTIONNAIRE - PHQ9
2. FEELING DOWN, DEPRESSED OR HOPELESS: NOT AT ALL
1. LITTLE INTEREST OR PLEASURE IN DOING THINGS: NOT AT ALL
SUM OF ALL RESPONSES TO PHQ QUESTIONS 1-9: 0
SUM OF ALL RESPONSES TO PHQ9 QUESTIONS 1 & 2: 0

## 2024-06-28 ASSESSMENT — LIFESTYLE VARIABLES
HOW OFTEN DO YOU HAVE SIX OR MORE DRINKS ON ONE OCCASION: 1
HOW MANY STANDARD DRINKS CONTAINING ALCOHOL DO YOU HAVE ON A TYPICAL DAY: PATIENT DOES NOT DRINK
HOW OFTEN DO YOU HAVE A DRINK CONTAINING ALCOHOL: 1
HOW MANY STANDARD DRINKS CONTAINING ALCOHOL DO YOU HAVE ON A TYPICAL DAY: 0
HOW OFTEN DO YOU HAVE A DRINK CONTAINING ALCOHOL: NEVER

## 2024-07-02 ENCOUNTER — OFFICE VISIT (OUTPATIENT)
Dept: PRIMARY CARE CLINIC | Age: 24
End: 2024-07-02
Payer: COMMERCIAL

## 2024-07-02 VITALS
SYSTOLIC BLOOD PRESSURE: 98 MMHG | OXYGEN SATURATION: 99 % | WEIGHT: 131 LBS | HEART RATE: 89 BPM | HEIGHT: 64 IN | RESPIRATION RATE: 18 BRPM | BODY MASS INDEX: 22.36 KG/M2 | DIASTOLIC BLOOD PRESSURE: 58 MMHG | TEMPERATURE: 98.9 F

## 2024-07-02 DIAGNOSIS — H53.8 BALINT'S SYNDROME: ICD-10-CM

## 2024-07-02 DIAGNOSIS — G04.81 AUTOIMMUNE ENCEPHALOMYELITIS: ICD-10-CM

## 2024-07-02 DIAGNOSIS — Z00.00 MEDICARE ANNUAL WELLNESS VISIT, SUBSEQUENT: Primary | ICD-10-CM

## 2024-07-02 DIAGNOSIS — G90.1 DYSAUTONOMIA (HCC): ICD-10-CM

## 2024-07-02 DIAGNOSIS — Q87.89 VOLTAGE-GATED POTASSIUM CHANNEL ANTIBODY SYNDROME: ICD-10-CM

## 2024-07-02 DIAGNOSIS — Z23 NEED FOR VARICELLA VACCINE: ICD-10-CM

## 2024-07-02 DIAGNOSIS — E16.2 HYPOGLYCEMIA: ICD-10-CM

## 2024-07-02 DIAGNOSIS — Z15.89 MONOALLELIC MUTATION OF SPTAN1 GENE: ICD-10-CM

## 2024-07-02 DIAGNOSIS — G43.109 MIGRAINE WITH AURA AND WITHOUT STATUS MIGRAINOSUS, NOT INTRACTABLE: ICD-10-CM

## 2024-07-02 DIAGNOSIS — Z12.4 PAP SMEAR FOR CERVICAL CANCER SCREENING: ICD-10-CM

## 2024-07-02 DIAGNOSIS — F32.5 MAJOR DEPRESSIVE DISORDER IN FULL REMISSION, UNSPECIFIED WHETHER RECURRENT (HCC): ICD-10-CM

## 2024-07-02 PROCEDURE — G0439 PPPS, SUBSEQ VISIT: HCPCS | Performed by: INTERNAL MEDICINE

## 2024-07-02 ASSESSMENT — PATIENT HEALTH QUESTIONNAIRE - PHQ9
8. MOVING OR SPEAKING SO SLOWLY THAT OTHER PEOPLE COULD HAVE NOTICED. OR THE OPPOSITE, BEING SO FIGETY OR RESTLESS THAT YOU HAVE BEEN MOVING AROUND A LOT MORE THAN USUAL: NOT AT ALL
SUM OF ALL RESPONSES TO PHQ QUESTIONS 1-9: 0
3. TROUBLE FALLING OR STAYING ASLEEP: NOT AT ALL
SUM OF ALL RESPONSES TO PHQ9 QUESTIONS 1 & 2: 0
6. FEELING BAD ABOUT YOURSELF - OR THAT YOU ARE A FAILURE OR HAVE LET YOURSELF OR YOUR FAMILY DOWN: NOT AT ALL
4. FEELING TIRED OR HAVING LITTLE ENERGY: NOT AT ALL
SUM OF ALL RESPONSES TO PHQ QUESTIONS 1-9: 0
5. POOR APPETITE OR OVEREATING: NOT AT ALL
1. LITTLE INTEREST OR PLEASURE IN DOING THINGS: NOT AT ALL
SUM OF ALL RESPONSES TO PHQ QUESTIONS 1-9: 0
2. FEELING DOWN, DEPRESSED OR HOPELESS: NOT AT ALL
SUM OF ALL RESPONSES TO PHQ QUESTIONS 1-9: 0
10. IF YOU CHECKED OFF ANY PROBLEMS, HOW DIFFICULT HAVE THESE PROBLEMS MADE IT FOR YOU TO DO YOUR WORK, TAKE CARE OF THINGS AT HOME, OR GET ALONG WITH OTHER PEOPLE: NOT DIFFICULT AT ALL
9. THOUGHTS THAT YOU WOULD BE BETTER OFF DEAD, OR OF HURTING YOURSELF: NOT AT ALL
7. TROUBLE CONCENTRATING ON THINGS, SUCH AS READING THE NEWSPAPER OR WATCHING TELEVISION: NOT AT ALL

## 2024-07-02 NOTE — ASSESSMENT & PLAN NOTE
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  Patient was admitted in 2002, her diagnosis has resulted in sequelae  Patient no longer able to see  Dr Miguel Tinajero Neuroimmunologist at [facility] in [place] by telehealth  Patient  willing to establish with [facility] specialist

## 2024-07-02 NOTE — ASSESSMENT & PLAN NOTE
Patient has seen multiple specialist  Has seen EP Cards has had tilt tables   has seen Neurologist attributed to autoimmune encephalitis resulting autonomic nervous system dysfunction  Patient no longer able to see  Dr Miguel Tinajero Neuroimmunologist at [facility] in [place] by telehealth

## 2024-07-02 NOTE — ASSESSMENT & PLAN NOTE
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 Miguel Tinajero Neuroimmunologist at the [facility], however no longer sees patient, she  cannot do virtual visits out of state.

## 2024-07-02 NOTE — ASSESSMENT & PLAN NOTE
Patient was admitted in 2002, his diagnosis has resulted in sequelae  Patient no longer able to see  Dr Miguel Tinajero Neuroimmunologist at [facility] in [place] by telehealth

## 2024-07-02 NOTE — PROGRESS NOTES
Medicare Annual Wellness Visit    Johanny Murphy is here for Medicare AWV    Assessment & Plan   Medicare annual wellness visit, subsequent  Assessment & Plan:  Patient comes in for  Medicare AWV   we discussed age appropriate USPSTF screens  Over 75% of the visit was spent counselling patient on appropriate lifestyle choices.        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  Patient was admitted in 2002, her diagnosis has resulted in sequelae  Patient no longer able to see  Dr Miguel Tinajero Neuroimmunologist at [facility] by telehealth  Patient  willing to establish with [facility] specialist  Voltage-gated potassium channel antibody syndrome  Assessment & Plan:   Patient was admitted in 2002, his diagnosis has resulted in sequelae  Patient no longer able to see  Dr Miguel Tinajero Neuroimmunologist at [facility] by telehealth    Monoallelic mutation of SPTAN1 gene  Assessment & Plan:   Mutation of SPTAN1 gene, unsure if it is implicated in voltage-gated potassium channel antibody syndrome which has resulted in muscular weakness and different comorbidities  Patient used  to see Dr. Miguel Tinajero Neuroimmunologist at the [facility], however no longer sees patient, she  cannot do virtual visits out of state.    Balint's syndrome  Assessment & Plan:  Patient sees ophthalmologist, follow-up with ophthalmologist as per their orders  Dysautonomia (HCC)  Assessment & Plan:   Patient has seen multiple specialist  Has seen EP Cards has had tilt tables   has seen Neurologist attributed to autoimmune encephalitis resulting autonomic nervous system dysfunction  Patient no longer able to see  Dr Miguel Tinajero Neuroimmunologist at [facility] by telehealth      Migraine with aura and without status

## 2024-07-02 NOTE — ASSESSMENT & PLAN NOTE
Continue as needed nutritional shakes.  Continue monitoring blood sugar we will get some blood work.  Seeing  endocrinology.

## 2024-07-02 NOTE — PATIENT INSTRUCTIONS
before sex. This can cause you to let down your guard and not practice safer sex.  Having one sex partner (who does not have STIs and does not have sex with anyone else) is a sure way to avoid STIs.  Talk to your partner before you have sex. Find out if he or she has or is at risk for any STI. Keep in mind that a person may be able to spread an STI even if he or she does not have symptoms. You and your partner may want to get an HIV test. You should get tested again 6 months later.    © 7746-9364 [employer]. Care instructions adapted under license by [facility] [employer]. This care instruction is for use with your licensed healthcare professional. If you have questions about a medical condition or this instruction, always ask your healthcare professional. [employer] disclaims any warranty or liability for your use of this information.  Content Version: 9.4.28273; Last Revised: January 19, 2012

## 2024-07-21 DIAGNOSIS — F32.5 MAJOR DEPRESSIVE DISORDER IN FULL REMISSION, UNSPECIFIED WHETHER RECURRENT (HCC): ICD-10-CM

## 2024-07-21 DIAGNOSIS — E16.2 HYPOGLYCEMIA: ICD-10-CM

## 2024-07-21 DIAGNOSIS — G04.81 AUTOIMMUNE ENCEPHALOMYELITIS: ICD-10-CM

## 2024-07-21 DIAGNOSIS — M62.838 MUSCLE SPASM: ICD-10-CM

## 2024-07-22 DIAGNOSIS — E16.2 HYPOGLYCEMIA: ICD-10-CM

## 2024-07-22 RX ORDER — ESCITALOPRAM OXALATE 10 MG/1
10 TABLET ORAL DAILY
Qty: 90 TABLET | Refills: 1 | Status: SHIPPED | OUTPATIENT
Start: 2024-07-22

## 2024-07-22 RX ORDER — ACYCLOVIR 400 MG/1
1 TABLET ORAL
Qty: 3 EACH | Refills: 1 | Status: SHIPPED | OUTPATIENT
Start: 2024-07-22

## 2024-07-22 RX ORDER — BACLOFEN 10 MG/1
10 TABLET ORAL 3 TIMES DAILY
Qty: 90 TABLET | Refills: 5 | Status: SHIPPED | OUTPATIENT
Start: 2024-07-22

## 2024-07-22 RX ORDER — BUPROPION HYDROCHLORIDE 300 MG/1
300 TABLET ORAL
Qty: 90 TABLET | Refills: 1 | Status: SHIPPED | OUTPATIENT
Start: 2024-07-22

## 2024-08-01 PROBLEM — Z00.00 MEDICARE ANNUAL WELLNESS VISIT, SUBSEQUENT: Status: RESOLVED | Noted: 2022-05-03 | Resolved: 2024-08-01

## 2024-08-01 PROBLEM — Z12.4 PAP SMEAR FOR CERVICAL CANCER SCREENING: Status: RESOLVED | Noted: 2024-07-02 | Resolved: 2024-08-01

## 2024-08-23 DIAGNOSIS — M62.838 MUSCLE SPASM: ICD-10-CM

## 2024-08-23 DIAGNOSIS — G04.81 AUTOIMMUNE ENCEPHALOMYELITIS: ICD-10-CM

## 2024-08-23 DIAGNOSIS — F32.5 MAJOR DEPRESSIVE DISORDER IN FULL REMISSION, UNSPECIFIED WHETHER RECURRENT (HCC): ICD-10-CM

## 2024-08-23 RX ORDER — ESCITALOPRAM OXALATE 10 MG/1
10 TABLET ORAL DAILY
Qty: 90 TABLET | Refills: 1 | Status: SHIPPED | OUTPATIENT
Start: 2024-08-23

## 2024-08-23 RX ORDER — BACLOFEN 10 MG/1
10 TABLET ORAL 3 TIMES DAILY
Qty: 90 TABLET | Refills: 5 | Status: SHIPPED | OUTPATIENT
Start: 2024-08-23

## 2024-08-23 RX ORDER — BUPROPION HYDROCHLORIDE 300 MG/1
300 TABLET ORAL
Qty: 90 TABLET | Refills: 1 | Status: SHIPPED | OUTPATIENT
Start: 2024-08-23

## 2024-09-20 DIAGNOSIS — F32.5 MAJOR DEPRESSIVE DISORDER IN FULL REMISSION, UNSPECIFIED WHETHER RECURRENT (HCC): ICD-10-CM

## 2024-09-20 RX ORDER — ESCITALOPRAM OXALATE 10 MG/1
10 TABLET ORAL DAILY
Qty: 90 TABLET | Refills: 1 | Status: SHIPPED | OUTPATIENT
Start: 2024-09-20

## 2024-09-23 DIAGNOSIS — E16.2 HYPOGLYCEMIA: ICD-10-CM

## 2024-09-23 RX ORDER — ACYCLOVIR 400 MG/1
1 TABLET ORAL
Qty: 3 EACH | Refills: 0 | Status: SHIPPED | OUTPATIENT
Start: 2024-09-23

## 2024-09-25 DIAGNOSIS — E16.2 HYPOGLYCEMIA: ICD-10-CM

## 2024-09-25 RX ORDER — ACYCLOVIR 400 MG/1
TABLET ORAL
Qty: 3 EACH | Refills: 0 | OUTPATIENT
Start: 2024-09-25

## 2024-10-11 ENCOUNTER — OFFICE VISIT (OUTPATIENT)
Dept: ENDOCRINOLOGY | Age: 24
End: 2024-10-11

## 2024-10-11 VITALS — BODY MASS INDEX: 22.49 KG/M2 | HEIGHT: 64 IN

## 2024-10-11 DIAGNOSIS — E16.2 HYPOGLYCEMIA: Primary | ICD-10-CM

## 2024-10-11 DIAGNOSIS — Q87.89 VOLTAGE-GATED POTASSIUM CHANNEL ANTIBODY SYNDROME: ICD-10-CM

## 2024-10-11 RX ORDER — ALBUTEROL SULFATE 90 UG/1
2 INHALANT RESPIRATORY (INHALATION) EVERY 6 HOURS PRN
COMMUNITY
Start: 2024-10-01

## 2024-10-11 NOTE — PROGRESS NOTES
daily as needed for Pain   Yes Provider, MD Bethany   topiramate (TOPAMAX) 50 MG tablet Take 1 tablet by mouth 2 times daily 4/1/24 9/28/24  Omar Us MD       Physical Exam:      Vitals: Ht 1.626 m (5' 4\")   BMI 22.49 kg/m²                Wt Readings from Last 3 Encounters:   07/02/24 59.4 kg (131 lb)   06/10/24 60.3 kg (133 lb)   01/12/24 56.2 kg (124 lb)       General appearance: alert, appears stated age and cooperative  Skin: Skin color, texture, turgor normal. No rashes or lesions  Neurologic: Mental status: Alert, oriented, thought content appropriate, interrupted speech    LABS:    Hemoglobin A1C (%)   Date Value   05/08/2023 5.3   05/03/2022 4.8     LDL Calculated (mg/dL)   Date Value   05/08/2023 100   05/03/2022 67     HDL (mg/dL)   Date Value   05/08/2023 56   05/03/2022 56     Creatinine (mg/dL)   Date Value   05/08/2023 0.98   02/20/2023 0.9        Assessment/Plan:   1. Hypoglycemia  Reports many years of hypoglycemia detected on glucometer.  Hard to ascertain if she meets Whipple triad for hypoglycemia diagnosis.  No personal history of diabetes and no evidence of insulin resistance.  A1c was normal in the past and most venous glucose levels are in the 70s.  Has family history of type 1 diabetes.  No GI surgeries.  Current medication list is unlikely to be contributing although Wellbutrin may occasionally cause hypoglycemia.    Voltage-gated potassium channel are expressed on myocytes, cardiomyocytes as well as pancreatic beta cells.  Patients with loss of function pathogenic variants may have lowered her serum glucose and increase of postprandial GLP-1 agonist and insulin secretion.  This might be contributing.    To minimize risk of hypoglycemia, would recommend dietary efforts as the cornerstone of treatment.  I agree about continuing with high-protein and low-carb diet and having frequent small meals.  Depending on protein such as cheese and peanut butter for snacks would be very

## 2024-10-18 DIAGNOSIS — E16.2 HYPOGLYCEMIA: ICD-10-CM

## 2024-10-18 RX ORDER — ACYCLOVIR 400 MG/1
TABLET ORAL
Qty: 3 EACH | Refills: 0 | Status: SHIPPED | OUTPATIENT
Start: 2024-10-18

## 2024-10-21 DIAGNOSIS — E16.2 HYPOGLYCEMIA: ICD-10-CM

## 2024-10-22 RX ORDER — ACYCLOVIR 400 MG/1
TABLET ORAL
Qty: 3 EACH | Refills: 4 | Status: SHIPPED | OUTPATIENT
Start: 2024-10-22

## 2024-10-23 DIAGNOSIS — J45.909 ASTHMA WITH SEVERITY TO BE DETERMINED: Primary | ICD-10-CM

## 2024-10-23 RX ORDER — FLUTICASONE PROPIONATE AND SALMETEROL XINAFOATE 115; 21 UG/1; UG/1
2 AEROSOL, METERED RESPIRATORY (INHALATION) 2 TIMES DAILY
Qty: 1 EACH | Refills: 3 | Status: SHIPPED | OUTPATIENT
Start: 2024-10-23 | End: 2024-10-24 | Stop reason: SDUPTHER

## 2024-10-24 ENCOUNTER — TELEPHONE (OUTPATIENT)
Dept: PRIMARY CARE CLINIC | Age: 24
End: 2024-10-24

## 2024-10-24 ENCOUNTER — OFFICE VISIT (OUTPATIENT)
Dept: PRIMARY CARE CLINIC | Age: 24
End: 2024-10-24
Payer: COMMERCIAL

## 2024-10-24 VITALS
HEART RATE: 100 BPM | OXYGEN SATURATION: 98 % | RESPIRATION RATE: 16 BRPM | TEMPERATURE: 98.3 F | BODY MASS INDEX: 19.66 KG/M2 | WEIGHT: 118 LBS | HEIGHT: 65 IN | SYSTOLIC BLOOD PRESSURE: 96 MMHG | DIASTOLIC BLOOD PRESSURE: 58 MMHG

## 2024-10-24 DIAGNOSIS — J45.909 ASTHMA WITH SEVERITY TO BE DETERMINED: ICD-10-CM

## 2024-10-24 DIAGNOSIS — J01.00 ACUTE NON-RECURRENT MAXILLARY SINUSITIS: Primary | ICD-10-CM

## 2024-10-24 DIAGNOSIS — J98.01 BRONCHOSPASM: ICD-10-CM

## 2024-10-24 PROCEDURE — 1036F TOBACCO NON-USER: CPT | Performed by: INTERNAL MEDICINE

## 2024-10-24 PROCEDURE — 99213 OFFICE O/P EST LOW 20 MIN: CPT | Performed by: INTERNAL MEDICINE

## 2024-10-24 PROCEDURE — G8484 FLU IMMUNIZE NO ADMIN: HCPCS | Performed by: INTERNAL MEDICINE

## 2024-10-24 PROCEDURE — G8427 DOCREV CUR MEDS BY ELIG CLIN: HCPCS | Performed by: INTERNAL MEDICINE

## 2024-10-24 PROCEDURE — G8420 CALC BMI NORM PARAMETERS: HCPCS | Performed by: INTERNAL MEDICINE

## 2024-10-24 RX ORDER — FLUTICASONE PROPIONATE AND SALMETEROL XINAFOATE 115; 21 UG/1; UG/1
2 AEROSOL, METERED RESPIRATORY (INHALATION) 2 TIMES DAILY
Qty: 1 EACH | Refills: 3 | Status: SHIPPED | OUTPATIENT
Start: 2024-10-24

## 2024-10-24 RX ORDER — DOXYCYCLINE HYCLATE 100 MG
100 TABLET ORAL 2 TIMES DAILY
Qty: 20 TABLET | Refills: 0 | Status: SHIPPED | OUTPATIENT
Start: 2024-10-24 | End: 2024-11-03

## 2024-10-24 RX ORDER — PREDNISONE 20 MG/1
40 TABLET ORAL DAILY
COMMUNITY
Start: 2024-10-21

## 2024-10-24 ASSESSMENT — ENCOUNTER SYMPTOMS
EYE PAIN: 0
CHEST TIGHTNESS: 0
BACK PAIN: 0
SINUS PRESSURE: 1
SORE THROAT: 0
SHORTNESS OF BREATH: 0
BLOOD IN STOOL: 0
VOMITING: 0
COLOR CHANGE: 0
CONSTIPATION: 0
WHEEZING: 1
ABDOMINAL PAIN: 0
EYE REDNESS: 0
ABDOMINAL DISTENTION: 0
TROUBLE SWALLOWING: 0
SINUS PAIN: 1
COUGH: 1
DIARRHEA: 0
NAUSEA: 0

## 2024-10-24 NOTE — ASSESSMENT & PLAN NOTE
will prescribe doxycycline, patient given precautions to take while on doxycycline - Take with 8 ounces of water, remain upright 30-45 minutes. Avoid sunlight/protect skin to lower incidence of photosensitivity rash.

## 2024-10-24 NOTE — ASSESSMENT & PLAN NOTE
we will get PFT, prescribe generic Advair.  Patient will have to DC albuterol an Advair leading up to her PFT

## 2024-10-24 NOTE — PROGRESS NOTES
Johanny Murphy is 24 y.o. female who p/w Sinusitis    The patient complained of   nasal congestion, postnasal drip/drainage with dry cough, sinus pain/sinus headache. Patient denies  /fever chills, myalgia, arthralgia, nausea/vomiting/diarrhea. Patient denies contact with COVID-19 /influenza POS individual.    Other issues discussed      Cough/wheeze -patient has history of childhood asthma.  Patient complaining of 2-week history of dry cough triggered when wheeze with mild shortness of breath.  She says she has been using albuterol inhaler which has helped but her symptoms persist. Patient will like to know what the next step is and treatment options are.     The problem and medicine lists and chart were reviewed in detail.       Review of Systems   Constitutional:  Negative for activity change, appetite change, chills, fatigue, fever and unexpected weight change.   HENT:  Positive for sinus pressure and sinus pain. Negative for congestion, ear pain, postnasal drip, sore throat, tinnitus and trouble swallowing.    Eyes:  Negative for pain and redness.   Respiratory:  Positive for cough and wheezing. Negative for chest tightness and shortness of breath.    Cardiovascular:  Negative for chest pain, palpitations and leg swelling.   Gastrointestinal:  Negative for abdominal distention, abdominal pain, blood in stool, constipation, diarrhea, nausea and vomiting.   Endocrine: Negative for cold intolerance, heat intolerance and polydipsia.   Genitourinary:  Negative for decreased urine volume, dysuria, flank pain, frequency, hematuria and urgency.   Musculoskeletal:  Negative for arthralgias, back pain, joint swelling, neck pain and neck stiffness.   Skin:  Negative for color change and rash.   Neurological:  Negative for dizziness, weakness, numbness and headaches.   Hematological:  Negative for adenopathy.   Psychiatric/Behavioral:  Negative for behavioral problems, sleep disturbance and suicidal ideas. The

## 2024-10-24 NOTE — TELEPHONE ENCOUNTER
Glenis needs her Advair needs to also go to the [facility]'s pharmacy on [address].

## 2024-10-29 ENCOUNTER — PATIENT MESSAGE (OUTPATIENT)
Dept: ENDOCRINOLOGY | Age: 24
End: 2024-10-29

## 2024-10-29 DIAGNOSIS — E16.2 HYPOGLYCEMIA: Primary | ICD-10-CM

## 2024-10-30 RX ORDER — PROCHLORPERAZINE 25 MG/1
SUPPOSITORY RECTAL
Qty: 1 EACH | Refills: 0 | Status: SHIPPED | OUTPATIENT
Start: 2024-10-30

## 2024-10-31 RX ORDER — PROCHLORPERAZINE 25 MG/1
SUPPOSITORY RECTAL
Qty: 1 EACH | Refills: 3 | Status: SHIPPED | OUTPATIENT
Start: 2024-10-31

## 2024-10-31 NOTE — TELEPHONE ENCOUNTER
Pt calling and states the Transmitter is missing from the box and she called pharmacy and they told her another rx will have to be sent over for the Dexcom G6 transmitter    [facility] on [address] in [place]    Pt requesting it to be sent in the next hr because she is in between class    CB# [phone]

## 2024-11-03 DIAGNOSIS — J01.00 ACUTE NON-RECURRENT MAXILLARY SINUSITIS: ICD-10-CM

## 2024-11-04 RX ORDER — DOXYCYCLINE HYCLATE 100 MG
100 TABLET ORAL 2 TIMES DAILY
Qty: 14 TABLET | Refills: 0 | Status: SHIPPED | OUTPATIENT
Start: 2024-11-04 | End: 2024-11-11

## 2024-11-13 ENCOUNTER — PATIENT MESSAGE (OUTPATIENT)
Dept: ENDOCRINOLOGY | Age: 24
End: 2024-11-13

## 2024-11-13 ENCOUNTER — PATIENT MESSAGE (OUTPATIENT)
Dept: PRIMARY CARE CLINIC | Age: 24
End: 2024-11-13

## 2024-11-13 DIAGNOSIS — E16.2 HYPOGLYCEMIA: ICD-10-CM

## 2024-11-13 RX ORDER — ACYCLOVIR 400 MG/1
TABLET ORAL
Qty: 3 EACH | Refills: 4 | Status: SHIPPED | OUTPATIENT
Start: 2024-11-13 | End: 2024-11-13

## 2024-11-13 RX ORDER — ACYCLOVIR 400 MG/1
TABLET ORAL
Qty: 3 EACH | Refills: 4 | Status: SHIPPED
Start: 2024-11-13 | End: 2024-11-13 | Stop reason: SDUPTHER

## 2024-11-15 DIAGNOSIS — J45.909 ASTHMA WITH SEVERITY TO BE DETERMINED: ICD-10-CM

## 2024-11-15 RX ORDER — FLUTICASONE PROPIONATE AND SALMETEROL XINAFOATE 115; 21 UG/1; UG/1
2 AEROSOL, METERED RESPIRATORY (INHALATION) 2 TIMES DAILY
Qty: 1 EACH | Refills: 11 | Status: SHIPPED | OUTPATIENT
Start: 2024-11-15

## 2024-11-26 ENCOUNTER — TELEMEDICINE (OUTPATIENT)
Dept: PRIMARY CARE CLINIC | Age: 24
End: 2024-11-26
Payer: COMMERCIAL

## 2024-11-26 DIAGNOSIS — G90.1 DYSAUTONOMIA (HCC): ICD-10-CM

## 2024-11-26 DIAGNOSIS — E16.2 HYPOGLYCEMIA: ICD-10-CM

## 2024-11-26 DIAGNOSIS — Z15.89 MONOALLELIC MUTATION OF SPTAN1 GENE: ICD-10-CM

## 2024-11-26 DIAGNOSIS — G43.109 MIGRAINE WITH AURA AND WITHOUT STATUS MIGRAINOSUS, NOT INTRACTABLE: ICD-10-CM

## 2024-11-26 DIAGNOSIS — G04.81 AUTOIMMUNE ENCEPHALOMYELITIS: Primary | ICD-10-CM

## 2024-11-26 PROCEDURE — 1036F TOBACCO NON-USER: CPT | Performed by: INTERNAL MEDICINE

## 2024-11-26 PROCEDURE — 99214 OFFICE O/P EST MOD 30 MIN: CPT | Performed by: INTERNAL MEDICINE

## 2024-11-26 PROCEDURE — G8484 FLU IMMUNIZE NO ADMIN: HCPCS | Performed by: INTERNAL MEDICINE

## 2024-11-26 PROCEDURE — G8420 CALC BMI NORM PARAMETERS: HCPCS | Performed by: INTERNAL MEDICINE

## 2024-11-26 PROCEDURE — G8427 DOCREV CUR MEDS BY ELIG CLIN: HCPCS | Performed by: INTERNAL MEDICINE

## 2024-11-26 ASSESSMENT — ENCOUNTER SYMPTOMS
COLOR CHANGE: 0
COUGH: 0
SORE THROAT: 0
DIARRHEA: 0
SINUS PRESSURE: 0
CONSTIPATION: 0
EYE REDNESS: 0
BACK PAIN: 0
ABDOMINAL DISTENTION: 0
SHORTNESS OF BREATH: 0
ABDOMINAL PAIN: 0
TROUBLE SWALLOWING: 0
BLOOD IN STOOL: 0
NAUSEA: 0
WHEEZING: 0
VOMITING: 0
EYE PAIN: 0
CHEST TIGHTNESS: 0

## 2024-11-26 NOTE — ASSESSMENT & PLAN NOTE
Stable and controlled on Topamax 50 mg twice daily          0 = understands/communicates without difficulty

## 2024-11-26 NOTE — ASSESSMENT & PLAN NOTE
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  Patient was admitted in 2002, her diagnosis has resulted in sequelae  Patient no longer able to see  Dr Miguel Tinajero Neuroimmunologist at [facility] in [place] by telehealth  Patient  willing to establish with [facility] specialist

## 2024-11-26 NOTE — ASSESSMENT & PLAN NOTE
Patient has seen multiple specialist  Has seen EP Cards has had tilt tables   has seen Neurologist attributed to autoimmune encephalitis resulting autonomic nervous system dysfunction  Patient no longer able to see  Dr Miguel Tinajero Neuroimmunologist at [facility] in [place] by telehealth

## 2024-11-26 NOTE — PROGRESS NOTES
Negative for dizziness, weakness, numbness and headaches.   Hematological:  Negative for adenopathy.   Psychiatric/Behavioral:  Negative for behavioral problems, sleep disturbance and suicidal ideas. The patient is not nervous/anxious.           Objective   Patient-Reported Vitals  Patient-Reported Pulse: 97  Patient-Reported Temperature: 97.9  Patient-Reported Weight: 121lbs  Patient-Reported Height: 5'4\"       Physical Exam  [INSTRUCTIONS:  \"[x]\" Indicates a positive item  \"[]\" Indicates a negative item  -- DELETE ALL ITEMS NOT EXAMINED]    Constitutional: [x] Appears well-developed and well-nourished [x] No apparent distress      [] Abnormal -     Mental status: [x] Alert and awake  [x] Oriented to person/place/time [x] Able to follow commands    [] Abnormal -     Eyes:   EOM    [x]  Normal    [] Abnormal -   Sclera  [x]  Normal    [] Abnormal -          Discharge [x]  None visible   [] Abnormal -     HENT: [x] Normocephalic, atraumatic  [] Abnormal -   [x] Mouth/Throat: Mucous membranes are moist    External Ears [x] Normal  [] Abnormal -    Neck: [x] No visualized mass [] Abnormal -     Pulmonary/Chest: [x] Respiratory effort normal   [x] No visualized signs of difficulty breathing or respiratory distress        [] Abnormal -      Musculoskeletal:   [x] Normal gait with no signs of ataxia         [x] Normal range of motion of neck        [] Abnormal -     Neurological:        [x] No Facial Asymmetry (Cranial nerve 7 motor function) (limited exam due to video visit)          [x] No gaze palsy        [] Abnormal -          Skin:        [x] No significant exanthematous lesions or discoloration noted on facial skin         [] Abnormal -            Psychiatric:       [x] Normal Affect [] Abnormal -        [x] No Hallucinations    Other pertinent observable physical exam findings:-         On this date 11/26/2024 I have spent 30 minutes reviewing previous notes, test results and face to face (virtual) with the

## 2024-11-26 NOTE — ASSESSMENT & PLAN NOTE
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 Miguel Tinajero Neuroimmunologist at the [facility], however no longer sees patient, she  cannot do virtual visits out of state.

## 2025-01-28 DIAGNOSIS — F32.5 MAJOR DEPRESSIVE DISORDER IN FULL REMISSION, UNSPECIFIED WHETHER RECURRENT (HCC): ICD-10-CM

## 2025-01-29 RX ORDER — ESCITALOPRAM OXALATE 10 MG/1
10 TABLET ORAL DAILY
Qty: 90 TABLET | Refills: 1 | Status: SHIPPED | OUTPATIENT
Start: 2025-01-29

## 2025-01-29 NOTE — TELEPHONE ENCOUNTER
Recent Visits  Date Type Provider Dept   10/24/24 Office Visit Omar Us MD [facility]   07/02/24 Office Visit Omar Us MD [facility]   06/10/24 Office Visit Omar Us MD [facility]   11/22/23 Office Visit Omar Us MD [facility]   10/16/23 Office Visit Omar Us MD [facility]   Showing recent visits within past 540 days with a meds authorizing provider and meeting all other requirements  Future Appointments  Date Type Provider Dept   03/14/25 Appointment Omar Us MD [facility]   Showing future appointments within next 150 days with a meds authorizing provider and meeting all other requirements

## 2025-02-05 DIAGNOSIS — F32.5 MAJOR DEPRESSIVE DISORDER IN FULL REMISSION, UNSPECIFIED WHETHER RECURRENT (HCC): Primary | ICD-10-CM

## 2025-02-05 DIAGNOSIS — F32.5 MAJOR DEPRESSIVE DISORDER IN FULL REMISSION, UNSPECIFIED WHETHER RECURRENT (HCC): ICD-10-CM

## 2025-02-05 RX ORDER — BUPROPION HYDROCHLORIDE 150 MG/1
150 TABLET, EXTENDED RELEASE ORAL 2 TIMES DAILY
Qty: 60 TABLET | Refills: 5 | Status: SHIPPED | OUTPATIENT
Start: 2025-02-05

## 2025-02-05 RX ORDER — BUPROPION HYDROCHLORIDE 150 MG/1
150 TABLET, EXTENDED RELEASE ORAL 2 TIMES DAILY
Qty: 60 TABLET | Refills: 5 | Status: SHIPPED | OUTPATIENT
Start: 2025-02-05 | End: 2025-02-05 | Stop reason: SDUPTHER

## 2025-02-12 ENCOUNTER — TELEPHONE (OUTPATIENT)
Dept: PRIMARY CARE CLINIC | Age: 25
End: 2025-02-12

## 2025-02-12 NOTE — TELEPHONE ENCOUNTER
Katharine Hernández I received a call from Glenis regarding a letter that she needed from Dr. Us. She messaged him on 1-30-25 and he sent you a message on 2-3-25 to prepare a letter. Glenis states that she never go the letter. Please give her a call at [phone].

## 2025-02-14 NOTE — TELEPHONE ENCOUNTER
Patient states she needs this note by Wednesday February 19, 2025 it is the last day she can send the letter in.     Please reach out to the patient when the letter is completed.     Thank you.

## 2025-02-19 ENCOUNTER — TELEPHONE (OUTPATIENT)
Dept: ENDOCRINOLOGY | Age: 25
End: 2025-02-19

## 2025-02-19 DIAGNOSIS — G04.81 AUTOIMMUNE ENCEPHALOMYELITIS: ICD-10-CM

## 2025-02-19 DIAGNOSIS — G43.109 MIGRAINE WITH AURA AND WITHOUT STATUS MIGRAINOSUS, NOT INTRACTABLE: ICD-10-CM

## 2025-02-19 DIAGNOSIS — E16.2 HYPOGLYCEMIA: Primary | ICD-10-CM

## 2025-02-19 DIAGNOSIS — E16.2 HYPOGLYCEMIA: ICD-10-CM

## 2025-02-19 DIAGNOSIS — M62.838 MUSCLE SPASM: ICD-10-CM

## 2025-02-19 RX ORDER — PROCHLORPERAZINE 25 MG/1
SUPPOSITORY RECTAL
Qty: 3 EACH | Refills: 0 | Status: SHIPPED | OUTPATIENT
Start: 2025-02-19 | End: 2025-02-19 | Stop reason: SDUPTHER

## 2025-02-19 RX ORDER — PROCHLORPERAZINE 25 MG/1
SUPPOSITORY RECTAL
Qty: 3 EACH | Refills: 5 | Status: SHIPPED | OUTPATIENT
Start: 2025-02-19

## 2025-02-19 RX ORDER — TOPIRAMATE 50 MG/1
50 TABLET, FILM COATED ORAL 2 TIMES DAILY
Qty: 180 TABLET | Refills: 1 | Status: SHIPPED | OUTPATIENT
Start: 2025-02-19 | End: 2025-08-18

## 2025-02-19 RX ORDER — BACLOFEN 10 MG/1
10 TABLET ORAL 3 TIMES DAILY
Qty: 90 TABLET | Refills: 5 | Status: SHIPPED | OUTPATIENT
Start: 2025-02-19

## 2025-02-19 NOTE — TELEPHONE ENCOUNTER
Pt called asking for a rx to be sent for her dexcom g6 sensor please send to pharmacy below       [id number] - [place], [place] - [address] [phone] - F [phone]  [address] [place] [address]  Phone: [phone]  Fax: [phone]

## 2025-02-24 ENCOUNTER — PATIENT MESSAGE (OUTPATIENT)
Dept: ENDOCRINOLOGY | Age: 25
End: 2025-02-24

## 2025-03-11 ENCOUNTER — TELEPHONE (OUTPATIENT)
Dept: PRIMARY CARE CLINIC | Age: 25
End: 2025-03-11

## 2025-03-11 SDOH — ECONOMIC STABILITY: INCOME INSECURITY: IN THE LAST 12 MONTHS, WAS THERE A TIME WHEN YOU WERE NOT ABLE TO PAY THE MORTGAGE OR RENT ON TIME?: NO

## 2025-03-11 SDOH — HEALTH STABILITY: PHYSICAL HEALTH: ON AVERAGE, HOW MANY DAYS PER WEEK DO YOU ENGAGE IN MODERATE TO STRENUOUS EXERCISE (LIKE A BRISK WALK)?: 1 DAY

## 2025-03-11 SDOH — HEALTH STABILITY: PHYSICAL HEALTH: ON AVERAGE, HOW MANY MINUTES DO YOU ENGAGE IN EXERCISE AT THIS LEVEL?: 10 MIN

## 2025-03-11 SDOH — ECONOMIC STABILITY: FOOD INSECURITY: WITHIN THE PAST 12 MONTHS, THE FOOD YOU BOUGHT JUST DIDN'T LAST AND YOU DIDN'T HAVE MONEY TO GET MORE.: NEVER TRUE

## 2025-03-11 SDOH — ECONOMIC STABILITY: TRANSPORTATION INSECURITY
IN THE PAST 12 MONTHS, HAS LACK OF TRANSPORTATION KEPT YOU FROM MEETINGS, WORK, OR FROM GETTING THINGS NEEDED FOR DAILY LIVING?: NO

## 2025-03-11 SDOH — ECONOMIC STABILITY: FOOD INSECURITY: WITHIN THE PAST 12 MONTHS, YOU WORRIED THAT YOUR FOOD WOULD RUN OUT BEFORE YOU GOT MONEY TO BUY MORE.: NEVER TRUE

## 2025-03-11 SDOH — ECONOMIC STABILITY: TRANSPORTATION INSECURITY
IN THE PAST 12 MONTHS, HAS THE LACK OF TRANSPORTATION KEPT YOU FROM MEDICAL APPOINTMENTS OR FROM GETTING MEDICATIONS?: YES

## 2025-03-11 ASSESSMENT — PATIENT HEALTH QUESTIONNAIRE - PHQ9
SUM OF ALL RESPONSES TO PHQ QUESTIONS 1-9: 0
SUM OF ALL RESPONSES TO PHQ QUESTIONS 1-9: 0
2. FEELING DOWN, DEPRESSED OR HOPELESS: NOT AT ALL
SUM OF ALL RESPONSES TO PHQ QUESTIONS 1-9: 0
SUM OF ALL RESPONSES TO PHQ QUESTIONS 1-9: 0
1. LITTLE INTEREST OR PLEASURE IN DOING THINGS: NOT AT ALL

## 2025-03-11 ASSESSMENT — LIFESTYLE VARIABLES
HOW OFTEN DO YOU HAVE A DRINK CONTAINING ALCOHOL: 1
HOW MANY STANDARD DRINKS CONTAINING ALCOHOL DO YOU HAVE ON A TYPICAL DAY: 0
HOW OFTEN DO YOU HAVE A DRINK CONTAINING ALCOHOL: NEVER
HOW MANY STANDARD DRINKS CONTAINING ALCOHOL DO YOU HAVE ON A TYPICAL DAY: PATIENT DOES NOT DRINK
HOW OFTEN DO YOU HAVE SIX OR MORE DRINKS ON ONE OCCASION: 1

## 2025-03-11 NOTE — TELEPHONE ENCOUNTER
----- Message from Dr. Omar Us MD sent at 3/11/2025  9:33 AM EDT -----  Regarding: Medicare AWV  Please ask patient whether she could come in at 8:15 am on  3/14/25 and please change her visit to a Medicare AWV.

## 2025-03-14 ENCOUNTER — OFFICE VISIT (OUTPATIENT)
Dept: PRIMARY CARE CLINIC | Age: 25
End: 2025-03-14

## 2025-03-14 VITALS
HEIGHT: 65 IN | BODY MASS INDEX: 19.99 KG/M2 | WEIGHT: 120 LBS | OXYGEN SATURATION: 98 % | RESPIRATION RATE: 16 BRPM | DIASTOLIC BLOOD PRESSURE: 70 MMHG | SYSTOLIC BLOOD PRESSURE: 102 MMHG | TEMPERATURE: 98 F | HEART RATE: 66 BPM

## 2025-03-14 DIAGNOSIS — E16.2 HYPOGLYCEMIA: ICD-10-CM

## 2025-03-14 DIAGNOSIS — E55.9 VITAMIN D DEFICIENCY: ICD-10-CM

## 2025-03-14 DIAGNOSIS — G43.109 MIGRAINE WITH AURA AND WITHOUT STATUS MIGRAINOSUS, NOT INTRACTABLE: ICD-10-CM

## 2025-03-14 DIAGNOSIS — Z13.220 SCREENING CHOLESTEROL LEVEL: ICD-10-CM

## 2025-03-14 DIAGNOSIS — Z00.00 MEDICARE ANNUAL WELLNESS VISIT, SUBSEQUENT: Primary | ICD-10-CM

## 2025-03-14 DIAGNOSIS — M62.81 ABNORMAL GAIT DUE TO MUSCLE WEAKNESS: ICD-10-CM

## 2025-03-14 DIAGNOSIS — Z15.89 MONOALLELIC MUTATION OF SPTAN1 GENE: ICD-10-CM

## 2025-03-14 DIAGNOSIS — R53.83 OTHER FATIGUE: ICD-10-CM

## 2025-03-14 DIAGNOSIS — R73.09 IMPAIRED GLUCOSE REGULATION: ICD-10-CM

## 2025-03-14 DIAGNOSIS — G90.1 DYSAUTONOMIA (HCC): ICD-10-CM

## 2025-03-14 DIAGNOSIS — R55 VASOVAGAL SYNCOPE: ICD-10-CM

## 2025-03-14 DIAGNOSIS — G04.81 AUTOIMMUNE ENCEPHALOMYELITIS: ICD-10-CM

## 2025-03-14 DIAGNOSIS — Q87.89 VOLTAGE-GATED POTASSIUM CHANNEL ANTIBODY SYNDROME: ICD-10-CM

## 2025-03-14 DIAGNOSIS — G71.00 MUSCULAR DYSTROPHY (HCC): ICD-10-CM

## 2025-03-14 DIAGNOSIS — R26.9 ABNORMAL GAIT DUE TO MUSCLE WEAKNESS: ICD-10-CM

## 2025-03-14 DIAGNOSIS — Z12.4 PAP SMEAR FOR CERVICAL CANCER SCREENING: ICD-10-CM

## 2025-03-14 NOTE — ASSESSMENT & PLAN NOTE
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 Miguel Tinajero Neuroimmunologist at the [facility], however no longer sees patient, she  cannot do virtual visits out of state.      SmartLink not supported outside of the [facility] SmartSection.

## 2025-03-14 NOTE — ASSESSMENT & PLAN NOTE
Stable and controlled on Topamax 50 mg twice daily         Detail Level: Detailed Detail Level: Zone

## 2025-03-14 NOTE — ASSESSMENT & PLAN NOTE
Patient has seen multiple specialist  Has seen EP Cards has had tilt tables   has seen Neurologist attributed to autoimmune encephalitis resulting autonomic nervous system dysfunction  Patient no longer able to see  Dr Miguel Tinajero Neuroimmunologist at [facility] by telehealth     patient is in need of a new mobility device/power chair    Patient has a mobility limitation that impairs their ability to  toilet, eat, dress themself, groom or bathe themselves.  Mobility limitation cannot be improved with a cane or walker   use of a  mobility device/power chair will significantly improve ability to participate in daily activities within an out of their home  and patient will use on regular basis   patient has not expressed an unwillingness to use a  mobility device/power chair in an out of their home   patient has upper extremity function and overall physical and mental capabilities needed to safely propel the mobility device/power chair in an out of the home   patient also has a caregiver available and willing to provide assistance with the  mobility device/power chair    Prescription for Permobil power chair written and faxed to [employer] [facility]

## 2025-03-14 NOTE — ASSESSMENT & PLAN NOTE
Mutation of SPTAN1 gene, unsure if it is implicated in voltage-gated potassium channel antibody syndrome which has resulted in muscular weakness and different comorbidities.      patient is in need of a new mobility device/power chair    Patient has a mobility limitation that impairs their ability to  toilet, eat, dress themself, groom or bathe themselves.  Mobility limitation cannot be improved with a cane or walker   use of a  mobility device/power chair will significantly improve ability to participate in daily activities within an out of their home  and patient will use on regular basis   patient has not expressed an unwillingness to use a  mobility device/power chair in an out of their home   patient has upper extremity function and overall physical and mental capabilities needed to safely propel the mobility device/power chair in an out of the home   patient also has a caregiver available and willing to provide assistance with the  mobility device/power chair    Prescription for Permobil power chair written and faxed to [facility]

## 2025-03-14 NOTE — ASSESSMENT & PLAN NOTE
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  Patient was admitted in 2002, her diagnosis has resulted in sequelae  Patient no longer able to see  Dr Miguel Tinajero Neuroimmunologist at [facility] in [place] by telehealth  Patient  willing to establish with [facility] specialist    patient is in need of a new mobility device/power chair    Patient has a mobility limitation that impairs their ability to  toilet, eat, dress themself, groom or bathe themselves.  Mobility limitation cannot be improved with a cane or walker   use of a  mobility device/power chair will significantly improve ability to participate in daily activities within an out of their home  and patient will use on regular basis   patient has not expressed an unwillingness to use a  mobility device/power chair in an out of their home   patient has upper extremity function and overall physical and mental capabilities needed to safely propel the mobility device/power chair in an out of the home   patient also has a caregiver available and willing to provide assistance with the  mobility device/power chair    Prescription for Permobil power chair written and faxed to [employer]

## 2025-03-14 NOTE — PATIENT INSTRUCTIONS
want to make decisions about end-of-life care. Planning for the end of your life does not mean that you are giving up. It is a way to make sure that your wishes are met. Clearly stating your wishes can make it easier for your loved ones. Making plans while you are still able may also ease your mind and make your final days less stressful and more meaningful.  Follow-up care is a key part of your treatment and safety. Be sure to make and go to all appointments, and call your doctor if you are having problems. It's also a good idea to know your test results and keep a list of the medicines you take.  What can you do to plan for the end of life?  You can bring these issues up with your doctor. You do not need to wait until your doctor starts the conversation. You might start with, \"What makes life worth living for me is. . .\" And then follow it with, \"I would not be willing to live with . . .\" When you complete this sentence it helps your doctor understand your wishes.  Talk openly and honestly with your doctor. This is the best way to understand the decisions you will need to make as your health changes. Know that you can always change your mind.  Ask your doctor about commonly used life-support measures. These include tube feedings, breathing machines, and fluids given through a vein (I.V.). Understanding these treatments will help you decide whether you want them.  You may choose to have these life-supporting treatments for a limited time. This allows a trial period to see whether they will help you. You may also decide that you want your doctor to take only certain measures to keep you alive. It may help to think about the big picture, like what makes life worth living for you or what your values and goals are.  Talk to your doctor about how long you are likely to live. Your doctor may be able to give you an idea of what usually happens with your specific illness.  Think about preparing papers that state your

## 2025-03-14 NOTE — ASSESSMENT & PLAN NOTE
Patient was admitted in 2002, his diagnosis has resulted in sequelae  Patient no longer able to see  Dr Miguel Tinajero Neuroimmunologist at [facility] in [place] by telehealth

## 2025-03-14 NOTE — PROGRESS NOTES
Medicare Annual Wellness Visit    Johanny Murphy is here for Medicare AWV    Assessment & Plan   Medicare annual wellness visit, subsequent  Assessment & Plan:  Patient comes in for  Medicare AWV   we discussed age appropriate USPSTF screens  Over 75% of the visit was spent counselling patient on appropriate lifestyle choices.        Orders:  -     CBC with Auto Differential; Future  -     Comprehensive Metabolic Panel; Future  -     Hemoglobin A1C; Future  -     Lipid Panel; Future  -     Vitamin D 25 Hydroxy; Future  -     Vitamin B12; Future  -     TSH reflex to FT4; Future  Monoallelic mutation of SPTAN1 gene  Assessment & Plan:   Mutation of SPTAN1 gene, unsure if it is implicated in voltage-gated potassium channel antibody syndrome which has resulted in muscular weakness and different comorbidities  Patient used  to see Dr. Miguel Tinajero Neuroimmunologist at the [facility], however no longer sees patient, she  cannot do virtual visits out of state.      Muscular dystrophy (HCC)  Assessment & Plan:   Mutation of SPTAN1 gene, unsure if it is implicated in voltage-gated potassium channel antibody syndrome which has resulted in muscular weakness and different comorbidities.      patient is in need of a new mobility device/power chair    Patient has a mobility limitation that impairs their ability to  toilet, eat, dress themself, groom or bathe themselves.  Mobility limitation cannot be improved with a cane or walker   use of a  mobility device/power chair will significantly improve ability to participate in daily activities within an out of their home  and patient will use on regular basis   patient has not expressed an unwillingness to use a  mobility device/power chair in an out of their home   patient has upper extremity function and overall physical and mental capabilities needed to safely propel the mobility device/power chair in an out of the home   patient also has a

## 2025-03-14 NOTE — ASSESSMENT & PLAN NOTE
Mutation of SPTAN1 gene, unsure if it is implicated in voltage-gated potassium channel antibody syndrome which has resulted in muscular weakness and different comorbidities.      patient is in need of a new mobility device/power chair    Patient has a mobility limitation that impairs their ability to  toilet, eat, dress themself, groom or bathe themselves.  Mobility limitation cannot be improved with a cane or walker   use of a  mobility device/power chair will significantly improve ability to participate in daily activities within an out of their home  and patient will use on regular basis   patient has not expressed an unwillingness to use a  mobility device/power chair in an out of their home   patient has upper extremity function and overall physical and mental capabilities needed to safely propel the mobility device/power chair in an out of the home   patient also has a caregiver available and willing to provide assistance with the  mobility device/power chair    Prescription for Permobil power chair written and faxed to "[employer]" supply store

## 2025-03-19 DIAGNOSIS — G43.109 MIGRAINE WITH AURA AND WITHOUT STATUS MIGRAINOSUS, NOT INTRACTABLE: ICD-10-CM

## 2025-03-19 RX ORDER — TOPIRAMATE 50 MG/1
50 TABLET, FILM COATED ORAL 2 TIMES DAILY
Qty: 180 TABLET | Refills: 1 | Status: SHIPPED | OUTPATIENT
Start: 2025-03-19 | End: 2025-09-15

## 2025-03-19 NOTE — TELEPHONE ENCOUNTER
Medication:   Requested Prescriptions     Pending Prescriptions Disp Refills    topiramate (TOPAMAX) 50 MG tablet 180 tablet 1     Sig: Take 1 tablet by mouth 2 times daily      Last Filled:  2/19/25     Patient Phone Number: [phone] (home)     Last appt: 3/14/2025   Next appt: Visit date not found    Last OARRS:        No data to display              PDMP Monitoring:    Last PDMP [place] as Reviewed ([place]):  Review User Review Instant Review Result          Preferred Pharmacy:   [id number] - [place], [place] - [address] [phone] - F [phone]  [address] [address]  Phone: [phone] Fax: [phone]    Recent Visits  Date Type Provider Dept   03/14/25 Office Visit Omar Us MD [facility]   10/24/24 Office Visit Omar Us MD [facility]   07/02/24 Office Visit Omar Us MD [facility]   06/10/24 Office Visit Omar Us MD [facility]   11/22/23 Office Visit Omar Us MD [facility]   10/16/23 Office Visit Omar Us MD [facility]   Showing recent visits within past 540 days with a meds authorizing provider and meeting all other requirements  Future Appointments  No visits were found meeting these conditions.  Showing future appointments within next 150 days with a meds authorizing provider and meeting all other requirements     3/14/2025

## 2025-03-28 ENCOUNTER — OFFICE VISIT (OUTPATIENT)
Dept: ENDOCRINOLOGY | Age: 25
End: 2025-03-28
Payer: COMMERCIAL

## 2025-03-28 VITALS
SYSTOLIC BLOOD PRESSURE: 116 MMHG | WEIGHT: 120 LBS | HEART RATE: 96 BPM | HEIGHT: 65 IN | DIASTOLIC BLOOD PRESSURE: 77 MMHG | BODY MASS INDEX: 19.99 KG/M2

## 2025-03-28 DIAGNOSIS — Z13.220 SCREENING CHOLESTEROL LEVEL: ICD-10-CM

## 2025-03-28 DIAGNOSIS — Q87.89 VOLTAGE-GATED POTASSIUM CHANNEL ANTIBODY SYNDROME: ICD-10-CM

## 2025-03-28 DIAGNOSIS — E16.2 HYPOGLYCEMIA: Primary | ICD-10-CM

## 2025-03-28 DIAGNOSIS — E55.9 VITAMIN D DEFICIENCY: ICD-10-CM

## 2025-03-28 DIAGNOSIS — R73.09 IMPAIRED GLUCOSE REGULATION: ICD-10-CM

## 2025-03-28 DIAGNOSIS — R53.83 OTHER FATIGUE: ICD-10-CM

## 2025-03-28 LAB
25(OH)D3 SERPL-MCNC: 42.5 NG/ML
ALBUMIN SERPL-MCNC: 4.5 G/DL (ref 3.4–5)
ALBUMIN/GLOB SERPL: 2 {RATIO} (ref 1.1–2.2)
ALP SERPL-CCNC: 63 U/L (ref 40–129)
ALT SERPL-CCNC: 20 U/L (ref 10–40)
ANION GAP SERPL CALCULATED.3IONS-SCNC: 12 MMOL/L (ref 3–16)
AST SERPL-CCNC: 20 U/L (ref 15–37)
BASOPHILS # BLD: 0 K/UL (ref 0–0.2)
BASOPHILS NFR BLD: 0.4 %
BILIRUB SERPL-MCNC: <0.2 MG/DL (ref 0–1)
BUN SERPL-MCNC: 17 MG/DL (ref 7–20)
CALCIUM SERPL-MCNC: 9.8 MG/DL (ref 8.3–10.6)
CHLORIDE SERPL-SCNC: 104 MMOL/L (ref 99–110)
CHOLEST SERPL-MCNC: 175 MG/DL (ref 0–199)
CO2 SERPL-SCNC: 24 MMOL/L (ref 21–32)
CREAT SERPL-MCNC: 0.9 MG/DL (ref 0.6–1.1)
DEPRECATED RDW RBC AUTO: 13.6 % (ref 12.4–15.4)
EOSINOPHIL # BLD: 0 K/UL (ref 0–0.6)
EOSINOPHIL NFR BLD: 0.5 %
GFR SERPLBLD CREATININE-BSD FMLA CKD-EPI: 90 ML/MIN/{1.73_M2}
GLUCOSE SERPL-MCNC: 56 MG/DL (ref 70–99)
HCT VFR BLD AUTO: 41.1 % (ref 36–48)
HDLC SERPL-MCNC: 57 MG/DL (ref 40–60)
HGB BLD-MCNC: 13.6 G/DL (ref 12–16)
LDLC SERPL CALC-MCNC: 79 MG/DL
LYMPHOCYTES # BLD: 1.9 K/UL (ref 1–5.1)
LYMPHOCYTES NFR BLD: 22.7 %
MCH RBC QN AUTO: 30.4 PG (ref 26–34)
MCHC RBC AUTO-ENTMCNC: 33.1 G/DL (ref 31–36)
MCV RBC AUTO: 91.7 FL (ref 80–100)
MONOCYTES # BLD: 0.3 K/UL (ref 0–1.3)
MONOCYTES NFR BLD: 4.2 %
NEUTROPHILS # BLD: 5.9 K/UL (ref 1.7–7.7)
NEUTROPHILS NFR BLD: 72.2 %
PLATELET # BLD AUTO: 331 K/UL (ref 135–450)
PMV BLD AUTO: 8.8 FL (ref 5–10.5)
POTASSIUM SERPL-SCNC: 4.3 MMOL/L (ref 3.5–5.1)
PROT SERPL-MCNC: 6.7 G/DL (ref 6.4–8.2)
RBC # BLD AUTO: 4.49 M/UL (ref 4–5.2)
SODIUM SERPL-SCNC: 140 MMOL/L (ref 136–145)
TRIGL SERPL-MCNC: 197 MG/DL (ref 0–150)
TSH SERPL DL<=0.005 MIU/L-ACNC: 2.58 UIU/ML (ref 0.27–4.2)
VIT B12 SERPL-MCNC: 771 PG/ML (ref 211–911)
VLDLC SERPL CALC-MCNC: 39 MG/DL
WBC # BLD AUTO: 8.2 K/UL (ref 4–11)

## 2025-03-28 PROCEDURE — 99214 OFFICE O/P EST MOD 30 MIN: CPT | Performed by: INTERNAL MEDICINE

## 2025-03-28 PROCEDURE — 95251 CONT GLUC MNTR ANALYSIS I&R: CPT | Performed by: INTERNAL MEDICINE

## 2025-03-28 PROCEDURE — 1036F TOBACCO NON-USER: CPT | Performed by: INTERNAL MEDICINE

## 2025-03-28 PROCEDURE — G8420 CALC BMI NORM PARAMETERS: HCPCS | Performed by: INTERNAL MEDICINE

## 2025-03-28 PROCEDURE — G8427 DOCREV CUR MEDS BY ELIG CLIN: HCPCS | Performed by: INTERNAL MEDICINE

## 2025-03-28 NOTE — PROGRESS NOTES
-----------------------------  [employer] Clarity  -----------------------------  Glenis Mahan    YOB: 2000    Generated at: Fri, Mar 28, 2025 10:46 AM EDT    Reporting period: Sat Mar 15, 2025 - Fri Mar 28, 2025  -----------------------------  Glucose Details    Average glucose: 108 mg/dL    GMI: 5.9%    Standard deviation: 22 mg/dL    Coefficient of Variation: 20.5%  -----------------------------  Time in Range    Very High: 0%    High: 1%    In Range: 99%    Low: 0%    Very Low: <1%    Target Range   mg/dL    -----------------------------  Sensor usage    Days with data: 13/14    Time active: 95%    Avg. calibrations per day: 0.1

## 2025-03-28 NOTE — PROGRESS NOTES
[facility]        Patient:  Johanny Murphy                                               : 2000    MRN: [id number]  Date : 3/28/2025      CHIEF COMPLAINT:   Chief Complaint   Patient presents with    Follow-up    Hypoglycemia        HISTORY OF PRESENT ILLNESS:   Johanny Murphy is a very pleasant 25 y.o. female who presents with for follow up for hypoglycemia. 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     Patient reports having symptoms of confusion and tiredness for many years.  Due to muscular dystrophy, she has difficulty with ADLs and will usually ambulate by walking for short distances and using a wheelchair for longer distances or outside visits.    She reports that for many years she was diagnosed with hypoglycemia and reports that she had multiple occasions of high or low blood sugars. She reports that glucometer in the past read as low as LOW and otherwise in the 40-70 range.  She also has had blood sugars in the 180s after meals.  She was advised to eat high-protein diet with frequent small meals.  She also was provided with freestyle noah sensor. She was advised that completing testing requires her to be fasting which was deemed unsafe for her.      Patient reports that most often she is asymptomatic with those readings but might occasionally feel tired.  She had a seizure where she lost consciousness 2 years ago and she thinks it was related to low blood sugars though she denies that her blood sugars were checked then and reports that she regained consciousness spontaneously.    She had another episode in early .  She was in the riding the bus.  Due to [employer] outage, noah was not connecting through her phone.  Per patient, she had a seizure and needed an emergency dispatch.  Paramedics

## 2025-03-29 LAB
EST. AVERAGE GLUCOSE BLD GHB EST-MCNC: 93.9 MG/DL
HBA1C MFR BLD: 4.9 %

## 2025-03-30 ENCOUNTER — RESULTS FOLLOW-UP (OUTPATIENT)
Dept: PRIMARY CARE CLINIC | Age: 25
End: 2025-03-30

## 2025-03-30 NOTE — RESULT ENCOUNTER NOTE
Triglycerides were little bit elevated at 197 if patient was not fasting as expected.  If she was fasting triglycerides could easily be lowered with over-the-counter omega-3.

## 2025-04-13 PROBLEM — Z00.00 MEDICARE ANNUAL WELLNESS VISIT, SUBSEQUENT: Status: RESOLVED | Noted: 2022-05-03 | Resolved: 2025-04-13

## 2025-04-13 PROBLEM — Z12.4 PAP SMEAR FOR CERVICAL CANCER SCREENING: Status: RESOLVED | Noted: 2024-07-02 | Resolved: 2025-04-13

## 2025-05-02 ENCOUNTER — PATIENT MESSAGE (OUTPATIENT)
Dept: PRIMARY CARE CLINIC | Age: 25
End: 2025-05-02

## 2025-05-05 DIAGNOSIS — G04.81 AUTOIMMUNE ENCEPHALOMYELITIS: ICD-10-CM

## 2025-05-05 DIAGNOSIS — F44.4 FUNCTIONAL NEUROLOGICAL SYMPTOM DISORDER WITH ABNORMAL MOVEMENT: ICD-10-CM

## 2025-05-05 DIAGNOSIS — M62.81 ABNORMAL GAIT DUE TO MUSCLE WEAKNESS: ICD-10-CM

## 2025-05-05 DIAGNOSIS — G43.109 MIGRAINE WITH AURA AND WITHOUT STATUS MIGRAINOSUS, NOT INTRACTABLE: ICD-10-CM

## 2025-05-05 DIAGNOSIS — M62.838 MUSCLE SPASM: ICD-10-CM

## 2025-05-05 DIAGNOSIS — S39.012D STRAIN OF LUMBAR REGION, SUBSEQUENT ENCOUNTER: ICD-10-CM

## 2025-05-05 DIAGNOSIS — M25.551 RIGHT HIP PAIN: ICD-10-CM

## 2025-05-05 DIAGNOSIS — Q87.89 VOLTAGE-GATED POTASSIUM CHANNEL ANTIBODY SYNDROME: ICD-10-CM

## 2025-05-05 DIAGNOSIS — G71.00 MUSCULAR DYSTROPHY (HCC): Primary | ICD-10-CM

## 2025-05-05 DIAGNOSIS — Z15.89 MONOALLELIC MUTATION OF SPTAN1 GENE: ICD-10-CM

## 2025-05-05 DIAGNOSIS — G90.1 DYSAUTONOMIA (HCC): ICD-10-CM

## 2025-05-05 DIAGNOSIS — R26.9 ABNORMAL GAIT DUE TO MUSCLE WEAKNESS: ICD-10-CM

## 2025-05-05 DIAGNOSIS — R55 VASOVAGAL SYNCOPE: ICD-10-CM

## 2025-05-05 NOTE — TELEPHONE ENCOUNTER
Please let patient know I would first have to refer to functional capacity for further assessment.  The  recommendation to obtain her 's license will be determined a functional capacity assessment.  Please provide telephone number so patient could schedule her functional capacity assessment.

## 2025-05-06 ENCOUNTER — TELEPHONE (OUTPATIENT)
Dept: PRIMARY CARE CLINIC | Age: 25
End: 2025-05-06

## 2025-05-06 DIAGNOSIS — R55 VASOVAGAL SYNCOPE: ICD-10-CM

## 2025-05-06 DIAGNOSIS — E16.2 HYPOGLYCEMIA: ICD-10-CM

## 2025-05-06 DIAGNOSIS — G90.1 DYSAUTONOMIA (HCC): ICD-10-CM

## 2025-05-06 DIAGNOSIS — G04.81 AUTOIMMUNE ENCEPHALOMYELITIS: ICD-10-CM

## 2025-05-06 DIAGNOSIS — R26.9 ABNORMAL GAIT DUE TO MUSCLE WEAKNESS: ICD-10-CM

## 2025-05-06 DIAGNOSIS — F44.4 FUNCTIONAL NEUROLOGICAL SYMPTOM DISORDER WITH ABNORMAL MOVEMENT: ICD-10-CM

## 2025-05-06 DIAGNOSIS — M62.81 ABNORMAL GAIT DUE TO MUSCLE WEAKNESS: ICD-10-CM

## 2025-05-06 DIAGNOSIS — Q87.89 VOLTAGE-GATED POTASSIUM CHANNEL ANTIBODY SYNDROME: ICD-10-CM

## 2025-05-06 DIAGNOSIS — G71.00 MUSCULAR DYSTROPHY (HCC): ICD-10-CM

## 2025-05-06 DIAGNOSIS — Z15.89 MONOALLELIC MUTATION OF SPTAN1 GENE: Primary | ICD-10-CM

## 2025-05-06 NOTE — TELEPHONE ENCOUNTER
Glenis informed new orders were sent and that it is a [facility] facility and they should be able to see the order in the system.

## 2025-05-06 NOTE — TELEPHONE ENCOUNTER
I LVM for the patient letting her know that Dr. Us referred her to physical therapy and I left the phone number for her to call. The patient does a lot of messaging through her [employer] so I went and sent a copy of the referral to her Mychart as well.

## 2025-05-06 NOTE — TELEPHONE ENCOUNTER
Glenis called to say that she needs a referral for OT  and not PT and she needs it to specially say for driving evaluation. Please fax to the same fax number the PT was faxed to.Please call to advise.

## 2025-05-06 NOTE — TELEPHONE ENCOUNTER
New orders sent.  Please let patient know it is a [facility] facility we do not need to fax the paperwork.  Please provide telephone number so she can schedule appointment.

## 2025-05-13 NOTE — TELEPHONE ENCOUNTER
If she would would like to obtain paperwork from our office she will have to proceed with formal OT evaluation.  If she decides to go independent of our office the onus is on her.

## 2025-05-14 NOTE — TELEPHONE ENCOUNTER
Replied via [place] patient will need to have OT evaluation if she wishes to proceed obtaining driving eval paperwork from  aas well as called, no answer-left detailed vm and left office phone number.

## 2025-06-06 ENCOUNTER — PATIENT MESSAGE (OUTPATIENT)
Dept: ENDOCRINOLOGY | Age: 25
End: 2025-06-06

## 2025-07-15 ENCOUNTER — OFFICE VISIT (OUTPATIENT)
Dept: PRIMARY CARE CLINIC | Age: 25
End: 2025-07-15
Payer: MEDICARE

## 2025-07-15 VITALS
DIASTOLIC BLOOD PRESSURE: 70 MMHG | HEART RATE: 107 BPM | OXYGEN SATURATION: 99 % | HEIGHT: 64 IN | SYSTOLIC BLOOD PRESSURE: 100 MMHG | WEIGHT: 110 LBS | BODY MASS INDEX: 18.78 KG/M2

## 2025-07-15 DIAGNOSIS — Z15.89 MONOALLELIC MUTATION OF SPTAN1 GENE: ICD-10-CM

## 2025-07-15 DIAGNOSIS — G04.81 AUTOIMMUNE ENCEPHALOMYELITIS: Primary | ICD-10-CM

## 2025-07-15 DIAGNOSIS — G90.1 DYSAUTONOMIA (HCC): ICD-10-CM

## 2025-07-15 DIAGNOSIS — E16.2 HYPOGLYCEMIA: ICD-10-CM

## 2025-07-15 DIAGNOSIS — G43.109 MIGRAINE WITH AURA AND WITHOUT STATUS MIGRAINOSUS, NOT INTRACTABLE: ICD-10-CM

## 2025-07-15 DIAGNOSIS — G71.00 MUSCULAR DYSTROPHY (HCC): ICD-10-CM

## 2025-07-15 DIAGNOSIS — Q87.89 VOLTAGE-GATED POTASSIUM CHANNEL ANTIBODY SYNDROME: ICD-10-CM

## 2025-07-15 PROCEDURE — G2211 COMPLEX E/M VISIT ADD ON: HCPCS | Performed by: INTERNAL MEDICINE

## 2025-07-15 PROCEDURE — 99214 OFFICE O/P EST MOD 30 MIN: CPT | Performed by: INTERNAL MEDICINE

## 2025-07-15 PROCEDURE — 1036F TOBACCO NON-USER: CPT | Performed by: INTERNAL MEDICINE

## 2025-07-15 PROCEDURE — G8427 DOCREV CUR MEDS BY ELIG CLIN: HCPCS | Performed by: INTERNAL MEDICINE

## 2025-07-15 PROCEDURE — G8420 CALC BMI NORM PARAMETERS: HCPCS | Performed by: INTERNAL MEDICINE

## 2025-07-15 ASSESSMENT — ENCOUNTER SYMPTOMS
ABDOMINAL PAIN: 0
EYE REDNESS: 0
NAUSEA: 0
DIARRHEA: 0
SHORTNESS OF BREATH: 0
SORE THROAT: 0
TROUBLE SWALLOWING: 0
SINUS PRESSURE: 0
ABDOMINAL DISTENTION: 0
CHEST TIGHTNESS: 0
BLOOD IN STOOL: 0
VOMITING: 0
CONSTIPATION: 0
EYE PAIN: 0
WHEEZING: 0
COUGH: 0
COLOR CHANGE: 0
BACK PAIN: 0

## 2025-07-15 NOTE — ASSESSMENT & PLAN NOTE
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 Miguel Tinajero Neuroimmunologist at the [facility], however no longer sees patient, she  cannot do virtual visits out of state.

## 2025-07-15 NOTE — ASSESSMENT & PLAN NOTE
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  Patient was admitted in 2002, her diagnosis has resulted in sequelae  Patient no longer able to see  Dr Miguel Tinajero Neuroimmunologist at [facility] in [place] by telehealth  Patient  was referred to [facility] specialist in the past.

## 2025-07-15 NOTE — ASSESSMENT & PLAN NOTE
Mutation of SPTAN1 gene, unsure if it is implicated in voltage-gated potassium channel antibody syndrome which has resulted in muscular weakness and different comorbidities.

## 2025-07-15 NOTE — ASSESSMENT & PLAN NOTE
Patient was admitted in 2002,   Patient no longer able to see  Dr Miguel Tinajero Neuroimmunologist at [facility] in [place]

## 2025-07-15 NOTE — ASSESSMENT & PLAN NOTE
Patient has seen multiple specialist  Has seen EP Cards has had tilt tables   has seen Neurologist attributed to autoimmune encephalitis resulting autonomic nervous system dysfunction  Patient no longer able to see  Dr Miguel Tinajero Neuroimmunologist at [facility] in [place] by telehealth

## 2025-07-15 NOTE — ASSESSMENT & PLAN NOTE
Continue as needed nutritional shakes.  Continue monitoring blood sugar we will get some blood work.  Seeing  endocrinology.      SmartLink not supported outside of the Encounter Diagnoses SmartSection.

## 2025-07-15 NOTE — PROGRESS NOTES
Johanny Murphy (2000) is a 25 y.o. female   Follow-up and Diabetes     General health:  This patient presents for check up and refills. The problem and medicine lists and chart were reviewed in detail. The patient has been worked up and treated for this/these condition/s and is compliant with taking the medication without any significant side effects. The patient's condition/s is/are chronic and unchanged and otherwise remains stable. Feels well with minor complaints.    Main Problem Review -Autoimmune encephalitis/Mutation of SPTN 1 gene/voltage gated potassium channel antibody syndrome/ -  Patient has  complex PMH  resulting in movement disorder, muscle weakness, dizziness, abnormal speech, gait problems dysautonomia. She uses wheel chair for mobility. She works and is also student and uses bus for transportation.  Patient's symptoms have been stable and she is proceeding to acquire drivers license, she has been driving with temps.      Other issues discussed     Migraine headaches - Patient denies complaints or symptoms today. Patient mentions she is adherent with treatment.       2. Dysautonomia -patient saw cardiologist for tachycardia and presyncope thought to be because of dysautonomia.  Patient had an isolated event has not had recurrence. Patient denies complaints or symptoms today.      3 Hypoglycemia -patient has to eat every few hours to avoid episodes of low blood sugar.    Patient follows with endocrinology.    The ASCVD Risk score (Adriana DK, et al., 2019) failed to calculate for the following reasons:    The 2019 ASCVD risk score is only valid for ages 40 to 79     Review of Systems   Constitutional:  Negative for activity change, appetite change, chills, fatigue, fever and unexpected weight change.   HENT:  Negative for congestion, ear pain, postnasal drip, sinus pressure, sore throat, tinnitus and trouble swallowing.    Eyes:  Negative for pain and redness.   Respiratory:  Negative for

## 2025-07-16 DIAGNOSIS — G04.81 AUTOIMMUNE ENCEPHALOMYELITIS: ICD-10-CM

## 2025-07-16 DIAGNOSIS — G43.109 MIGRAINE WITH AURA AND WITHOUT STATUS MIGRAINOSUS, NOT INTRACTABLE: ICD-10-CM

## 2025-07-16 DIAGNOSIS — M62.838 MUSCLE SPASM: ICD-10-CM

## 2025-07-16 DIAGNOSIS — F32.5 MAJOR DEPRESSIVE DISORDER IN FULL REMISSION, UNSPECIFIED WHETHER RECURRENT: ICD-10-CM

## 2025-07-17 RX ORDER — TOPIRAMATE 50 MG/1
50 TABLET, FILM COATED ORAL 2 TIMES DAILY
Qty: 180 TABLET | Refills: 1 | Status: SHIPPED | OUTPATIENT
Start: 2025-07-17 | End: 2026-01-13

## 2025-07-17 RX ORDER — BUPROPION HYDROCHLORIDE 150 MG/1
150 TABLET, EXTENDED RELEASE ORAL DAILY
Qty: 30 TABLET | Refills: 5 | Status: SHIPPED | OUTPATIENT
Start: 2025-07-17

## 2025-07-17 RX ORDER — BACLOFEN 10 MG/1
10 TABLET ORAL 3 TIMES DAILY
Qty: 90 TABLET | Refills: 5 | Status: SHIPPED | OUTPATIENT
Start: 2025-07-17

## 2025-07-17 RX ORDER — ESCITALOPRAM OXALATE 10 MG/1
10 TABLET ORAL DAILY
Qty: 90 TABLET | Refills: 1 | Status: SHIPPED | OUTPATIENT
Start: 2025-07-17